# Patient Record
Sex: MALE | Race: WHITE | Employment: OTHER | URBAN - METROPOLITAN AREA
[De-identification: names, ages, dates, MRNs, and addresses within clinical notes are randomized per-mention and may not be internally consistent; named-entity substitution may affect disease eponyms.]

---

## 2021-08-07 ENCOUNTER — HOSPITAL ENCOUNTER (INPATIENT)
Age: 76
LOS: 2 days | Discharge: HOME | DRG: 378 | End: 2021-08-09
Attending: EMERGENCY MEDICINE | Admitting: HOSPITALIST
Payer: COMMERCIAL

## 2021-08-07 ENCOUNTER — APPOINTMENT (OUTPATIENT)
Dept: CT IMAGING | Age: 76
DRG: 378 | End: 2021-08-07
Attending: EMERGENCY MEDICINE
Payer: COMMERCIAL

## 2021-08-07 DIAGNOSIS — N17.9 AKI (ACUTE KIDNEY INJURY) (HCC): ICD-10-CM

## 2021-08-07 DIAGNOSIS — R68.89: ICD-10-CM

## 2021-08-07 DIAGNOSIS — R63.8: ICD-10-CM

## 2021-08-07 DIAGNOSIS — R10.31 RLQ ABDOMINAL PAIN: ICD-10-CM

## 2021-08-07 DIAGNOSIS — K92.2 GASTROINTESTINAL HEMORRHAGE, UNSPECIFIED GASTROINTESTINAL HEMORRHAGE TYPE: Primary | ICD-10-CM

## 2021-08-07 LAB
ALBUMIN SERPL-MCNC: 3.7 G/DL (ref 3.5–5)
ALBUMIN/GLOB SERPL: 1.3 {RATIO} (ref 1.1–2.2)
ALP SERPL-CCNC: 73 U/L (ref 45–117)
ALT SERPL-CCNC: 30 U/L (ref 12–78)
ANION GAP SERPL CALC-SCNC: 2 MMOL/L (ref 5–15)
APPEARANCE UR: CLEAR
AST SERPL-CCNC: 17 U/L (ref 15–37)
BACTERIA URNS QL MICRO: NEGATIVE /HPF
BASOPHILS # BLD: 0 K/UL (ref 0–0.1)
BASOPHILS NFR BLD: 1 % (ref 0–1)
BILIRUB SERPL-MCNC: 0.5 MG/DL (ref 0.2–1)
BILIRUB UR QL CFM: NEGATIVE
BUN SERPL-MCNC: 22 MG/DL (ref 6–20)
BUN/CREAT SERPL: 15 (ref 12–20)
CALCIUM SERPL-MCNC: 10.3 MG/DL (ref 8.5–10.1)
CHLORIDE SERPL-SCNC: 108 MMOL/L (ref 97–108)
CO2 SERPL-SCNC: 30 MMOL/L (ref 21–32)
COLOR UR: ABNORMAL
CREAT SERPL-MCNC: 1.5 MG/DL (ref 0.7–1.3)
DIFFERENTIAL METHOD BLD: ABNORMAL
EOSINOPHIL # BLD: 0.1 K/UL (ref 0–0.4)
EOSINOPHIL NFR BLD: 1 % (ref 0–7)
EPITH CASTS URNS QL MICRO: ABNORMAL /LPF
ERYTHROCYTE [DISTWIDTH] IN BLOOD BY AUTOMATED COUNT: 13 % (ref 11.5–14.5)
GLOBULIN SER CALC-MCNC: 2.9 G/DL (ref 2–4)
GLUCOSE SERPL-MCNC: 117 MG/DL (ref 65–100)
GLUCOSE UR STRIP.AUTO-MCNC: NEGATIVE MG/DL
HCT VFR BLD AUTO: 41.3 % (ref 36.6–50.3)
HGB BLD-MCNC: 13.5 G/DL (ref 12.1–17)
HGB UR QL STRIP: NEGATIVE
HYALINE CASTS URNS QL MICRO: ABNORMAL /LPF (ref 0–5)
IMM GRANULOCYTES # BLD AUTO: 0 K/UL (ref 0–0.04)
IMM GRANULOCYTES NFR BLD AUTO: 0 % (ref 0–0.5)
KETONES UR QL STRIP.AUTO: NEGATIVE MG/DL
LEUKOCYTE ESTERASE UR QL STRIP.AUTO: NEGATIVE
LIPASE SERPL-CCNC: 124 U/L (ref 73–393)
LYMPHOCYTES # BLD: 1.2 K/UL (ref 0.8–3.5)
LYMPHOCYTES NFR BLD: 14 % (ref 12–49)
MAGNESIUM SERPL-MCNC: 2.7 MG/DL (ref 1.6–2.4)
MCH RBC QN AUTO: 28.8 PG (ref 26–34)
MCHC RBC AUTO-ENTMCNC: 32.7 G/DL (ref 30–36.5)
MCV RBC AUTO: 88.2 FL (ref 80–99)
MONOCYTES # BLD: 0.7 K/UL (ref 0–1)
MONOCYTES NFR BLD: 8 % (ref 5–13)
NEUTS SEG # BLD: 6.6 K/UL (ref 1.8–8)
NEUTS SEG NFR BLD: 76 % (ref 32–75)
NITRITE UR QL STRIP.AUTO: NEGATIVE
NRBC # BLD: 0 K/UL (ref 0–0.01)
NRBC BLD-RTO: 0 PER 100 WBC
PH UR STRIP: 5.5 [PH] (ref 5–8)
PLATELET # BLD AUTO: 233 K/UL (ref 150–400)
PMV BLD AUTO: 10.2 FL (ref 8.9–12.9)
POTASSIUM SERPL-SCNC: 4.4 MMOL/L (ref 3.5–5.1)
PROT SERPL-MCNC: 6.6 G/DL (ref 6.4–8.2)
PROT UR STRIP-MCNC: ABNORMAL MG/DL
RBC # BLD AUTO: 4.68 M/UL (ref 4.1–5.7)
RBC #/AREA URNS HPF: ABNORMAL /HPF (ref 0–5)
SODIUM SERPL-SCNC: 140 MMOL/L (ref 136–145)
SP GR UR REFRACTOMETRY: 1.03 (ref 1–1.03)
UR CULT HOLD, URHOLD: NORMAL
UROBILINOGEN UR QL STRIP.AUTO: 0.2 EU/DL (ref 0.2–1)
WBC # BLD AUTO: 8.6 K/UL (ref 4.1–11.1)
WBC URNS QL MICRO: ABNORMAL /HPF (ref 0–4)

## 2021-08-07 PROCEDURE — 74011000258 HC RX REV CODE- 258: Performed by: HOSPITALIST

## 2021-08-07 PROCEDURE — 74011000636 HC RX REV CODE- 636: Performed by: STUDENT IN AN ORGANIZED HEALTH CARE EDUCATION/TRAINING PROGRAM

## 2021-08-07 PROCEDURE — 74011250636 HC RX REV CODE- 250/636: Performed by: HOSPITALIST

## 2021-08-07 PROCEDURE — 36415 COLL VENOUS BLD VENIPUNCTURE: CPT

## 2021-08-07 PROCEDURE — 74177 CT ABD & PELVIS W/CONTRAST: CPT

## 2021-08-07 PROCEDURE — 65270000029 HC RM PRIVATE

## 2021-08-07 PROCEDURE — 83690 ASSAY OF LIPASE: CPT

## 2021-08-07 PROCEDURE — 80053 COMPREHEN METABOLIC PANEL: CPT

## 2021-08-07 PROCEDURE — 85025 COMPLETE CBC W/AUTO DIFF WBC: CPT

## 2021-08-07 PROCEDURE — 81001 URINALYSIS AUTO W/SCOPE: CPT

## 2021-08-07 PROCEDURE — 99283 EMERGENCY DEPT VISIT LOW MDM: CPT

## 2021-08-07 PROCEDURE — 83735 ASSAY OF MAGNESIUM: CPT

## 2021-08-07 RX ORDER — ACETAMINOPHEN 325 MG/1
650 TABLET ORAL
Status: DISCONTINUED | OUTPATIENT
Start: 2021-08-07 | End: 2021-08-09 | Stop reason: HOSPADM

## 2021-08-07 RX ORDER — MORPHINE SULFATE 2 MG/ML
2 INJECTION, SOLUTION INTRAMUSCULAR; INTRAVENOUS
Status: DISCONTINUED | OUTPATIENT
Start: 2021-08-07 | End: 2021-08-09 | Stop reason: HOSPADM

## 2021-08-07 RX ORDER — OXYCODONE HYDROCHLORIDE 5 MG/1
5 TABLET ORAL
Status: DISCONTINUED | OUTPATIENT
Start: 2021-08-07 | End: 2021-08-09 | Stop reason: HOSPADM

## 2021-08-07 RX ORDER — DIPHENHYDRAMINE HCL 25 MG
25 CAPSULE ORAL
Status: DISCONTINUED | OUTPATIENT
Start: 2021-08-07 | End: 2021-08-09 | Stop reason: HOSPADM

## 2021-08-07 RX ORDER — DIPHENHYDRAMINE HYDROCHLORIDE 50 MG/ML
25 INJECTION, SOLUTION INTRAMUSCULAR; INTRAVENOUS
Status: DISCONTINUED | OUTPATIENT
Start: 2021-08-07 | End: 2021-08-09 | Stop reason: HOSPADM

## 2021-08-07 RX ORDER — CALCIUM CARB/MAGNESIUM CARB 311-232MG
5 TABLET ORAL
Status: DISCONTINUED | OUTPATIENT
Start: 2021-08-07 | End: 2021-08-09 | Stop reason: HOSPADM

## 2021-08-07 RX ORDER — POLYETHYLENE GLYCOL 3350 17 G/17G
17 POWDER, FOR SOLUTION ORAL DAILY PRN
Status: DISCONTINUED | OUTPATIENT
Start: 2021-08-07 | End: 2021-08-09 | Stop reason: HOSPADM

## 2021-08-07 RX ORDER — SODIUM CHLORIDE 0.9 % (FLUSH) 0.9 %
5-40 SYRINGE (ML) INJECTION EVERY 8 HOURS
Status: DISCONTINUED | OUTPATIENT
Start: 2021-08-07 | End: 2021-08-09 | Stop reason: HOSPADM

## 2021-08-07 RX ORDER — ONDANSETRON 2 MG/ML
4 INJECTION INTRAMUSCULAR; INTRAVENOUS
Status: DISCONTINUED | OUTPATIENT
Start: 2021-08-07 | End: 2021-08-09 | Stop reason: HOSPADM

## 2021-08-07 RX ORDER — SODIUM CHLORIDE 0.9 % (FLUSH) 0.9 %
5-40 SYRINGE (ML) INJECTION AS NEEDED
Status: DISCONTINUED | OUTPATIENT
Start: 2021-08-07 | End: 2021-08-09 | Stop reason: HOSPADM

## 2021-08-07 RX ORDER — FACIAL-BODY WIPES
10 EACH TOPICAL DAILY PRN
Status: DISCONTINUED | OUTPATIENT
Start: 2021-08-07 | End: 2021-08-09 | Stop reason: HOSPADM

## 2021-08-07 RX ORDER — ACETAMINOPHEN 650 MG/1
650 SUPPOSITORY RECTAL
Status: DISCONTINUED | OUTPATIENT
Start: 2021-08-07 | End: 2021-08-09 | Stop reason: HOSPADM

## 2021-08-07 RX ORDER — LORAZEPAM 2 MG/ML
0.5 INJECTION INTRAMUSCULAR
Status: DISCONTINUED | OUTPATIENT
Start: 2021-08-07 | End: 2021-08-09 | Stop reason: HOSPADM

## 2021-08-07 RX ORDER — SODIUM CHLORIDE 9 MG/ML
125 INJECTION, SOLUTION INTRAVENOUS CONTINUOUS
Status: DISCONTINUED | OUTPATIENT
Start: 2021-08-07 | End: 2021-08-09 | Stop reason: HOSPADM

## 2021-08-07 RX ADMIN — FAMOTIDINE 20 MG: 10 INJECTION, SOLUTION INTRAVENOUS at 21:58

## 2021-08-07 RX ADMIN — IOPAMIDOL 100 ML: 755 INJECTION, SOLUTION INTRAVENOUS at 18:32

## 2021-08-07 RX ADMIN — SODIUM CHLORIDE 125 ML/HR: 9 INJECTION, SOLUTION INTRAVENOUS at 21:59

## 2021-08-07 NOTE — ED PROVIDER NOTES
81-year-old male with a history of kidney cancer, hemorrhoids, hypertension, and anxiety has had 5 or 6 days of rectal bleeding, both bright red and dark red. He is also had some right lower quadrant abdominal pain. Occasional nausea, but no vomiting. No fever. This is not typical of his hemorrhoidal bleeding. He does not have a gastroenterologist.  No fevers. At times today he was feeling dizzy. No chest pain or trouble breathing.            Past Medical History:   Diagnosis Date    Arthritis     marc shoulders and right hip    Cancer ([address]     kidney    GERD (gastroesophageal reflux disease)     Hemorrhoids     High cholesterol     Hypertension     Ill-defined condition     cataracts--no surgery yet    Other ill-defined conditions(799.89) 8/2012    right renal mass newly diagnosed    Other ill-defined conditions(799.89)     heart murmur    Prostate infection     resolved    Psychiatric disorder     panic attacks, last one 9/2013; anxiety    Unspecified sleep apnea 2006    surgery- excision of uvula; 2013 using dental mouthpiece       Past Surgical History:   Procedure Laterality Date    HX APPENDECTOMY      as a child    HX CHOLECYSTECTOMY  10/30/2013    lap choley    HX HEENT  2006    excision of uvula    HX HERNIA REPAIR      rih repair age 8 and again 5 years ago, LIH 27 years ago   [facility]  [address]    right knee athroscopy    HX ORTHOPAEDIC Right 2016    hip replacement    HX SHOULDER REPLACEMENT Left 2014    HX SHOULDER REPLACEMENT Right 2015    HX TONSILLECTOMY      HX UROLOGICAL  2012    right partial nephrectomy    AL EXCISION OF UVULA           Family History:   Problem Relation Age of Onset    Hypertension Mother     Cancer Father     Heart Disease Father     Hypertension Father     Stroke Father        Social History     Socioeconomic History    Marital status:      Spouse name: Not on file    Number of children: Not on file    Years of education: Not on file    Highest education level: Not on file   Occupational History    Not on file   Tobacco Use    Smoking status: Never Smoker    Smokeless tobacco: Never Used   Substance and Sexual Activity    Alcohol use: Yes     Alcohol/week: 0.0 standard drinks     Comment: rarely, nothing past 6 months    Drug use: No    Sexual activity: Not on file   Other Topics Concern    Not on file   Social History Narrative    Not on file     Social Determinants of Health     Financial Resource Strain:     Difficulty of Paying Living Expenses:    Food Insecurity:     Worried About Running Out of Food in the Last Year:     [address] in the Last Year:    Transportation Needs:     Lack of Transportation (Medical):  Lack of Transportation (Non-Medical):    Physical Activity:     Days of Exercise per Week:     Minutes of Exercise per Session:    Stress:     Feeling of Stress :    Social Connections:     Frequency of Communication with Friends and Family:     Frequency of Social Gatherings with Friends and Family:     Attends Jain Services:     Active Member of Clubs or Organizations:     Attends Club or Organization Meetings:     Marital Status:    Intimate Partner Violence:     Fear of Current or Ex-Partner:     Emotionally Abused:     Physically Abused:     Sexually Abused: ALLERGIES: Patient has no known allergies. Review of Systems   Constitutional: Negative for fever. HENT: Negative for trouble swallowing. Eyes: Negative for visual disturbance. Respiratory: Negative for cough. Cardiovascular: Negative for chest pain. Gastrointestinal: Positive for abdominal pain and blood in stool. Genitourinary: Negative for difficulty urinating. Musculoskeletal: Negative for gait problem. Skin: Negative for rash. Neurological: Negative for headaches. Hematological: Does not bruise/bleed easily. Psychiatric/Behavioral: Negative for sleep disturbance.        Vitals:    08/07/21 1724   BP: 118/61   Pulse: (!) 112   Resp: 18   Temp: 97.1 °F (36.2 °C)   SpO2: 96%   Weight: 83.4 kg (183 lb 13.8 oz)   Height: 6' (1.829 m)            Physical Exam  Constitutional:       Appearance: Normal appearance. HENT:      Head: Normocephalic. Nose: Nose normal.      Mouth/Throat:      Mouth: Mucous membranes are moist.   Eyes:      Extraocular Movements: Extraocular movements intact. Conjunctiva/sclera: Conjunctivae normal.   Cardiovascular:      Rate and Rhythm: Normal rate. Pulmonary:      Effort: Pulmonary effort is normal. No respiratory distress. Abdominal:      General: Abdomen is flat. Tenderness: There is abdominal tenderness in the right lower quadrant. Musculoskeletal:         General: Normal range of motion. Skin:     Findings: No rash. Neurological:      General: No focal deficit present. Mental Status: He is alert. Psychiatric:         Behavior: Behavior normal.          MDM  Number of Diagnoses or Management Options  LILI (acute kidney injury) ([address]  Excessive intake of calcium  Excessive magnesium intake  Gastrointestinal hemorrhage, unspecified gastrointestinal hemorrhage type  RLQ abdominal pain  Diagnosis management comments: Perfect Serve Consult for Admission  7:43 PM    ED Room Number: SFMERWT/WT  Patient Name and age:  Diedre Gilford 70 y.o.  male  Working Diagnosis: Gastrointestinal hemorrhage, unspecified gastrointestinal hemorrhage type  (primary encounter diagnosis)  RLQ abdominal pain  LILI (acute kidney injury) ([address]  Excessive intake of calcium  Excessive magnesium intake    COVID-19 Suspicion:  no  Sepsis present:  no  Reassessment needed: no  Code Status:  Full Code  Readmission: no  Isolation Requirements:  no  Recommended Level of Care:  med/surg  Department:[facility] ED - [phone]  Other: Patient has no gastroenterologist, but appears to be bleeding from the right lower quadrant, seen on scan.   Heart rate is slightly elevated, but blood pressure is stable and hemoglobin is stable. This has likely been going on for about 5 days.          Amount and/or Complexity of Data Reviewed  Decide to obtain previous medical records or to obtain history from someone other than the patient: yes           Procedures

## 2021-08-07 NOTE — ED NOTES
Patient ambulatory to bathroom to void. Instructed on urine specimen collection. Verbalized understanding.

## 2021-08-07 NOTE — ED NOTES

## 2021-08-07 NOTE — ED TRIAGE NOTES
Pt reports abdominal pain and rectal bleeding x 5 days. Pt states he has internal hemorrhoids. Hx of IBS. Also reports back pain. Denies vomiting. Reports generalized weakness and diarrhea.

## 2021-08-08 LAB
25(OH)D3 SERPL-MCNC: 25.9 NG/ML (ref 30–100)
ALBUMIN SERPL-MCNC: 3.4 G/DL (ref 3.5–5)
ALBUMIN/GLOB SERPL: 1.3 {RATIO} (ref 1.1–2.2)
ALP SERPL-CCNC: 64 U/L (ref 45–117)
ALT SERPL-CCNC: 25 U/L (ref 12–78)
ANION GAP SERPL CALC-SCNC: 4 MMOL/L (ref 5–15)
AST SERPL-CCNC: 11 U/L (ref 15–37)
BASOPHILS # BLD: 0 K/UL (ref 0–0.1)
BASOPHILS NFR BLD: 1 % (ref 0–1)
BILIRUB SERPL-MCNC: 0.6 MG/DL (ref 0.2–1)
BUN SERPL-MCNC: 20 MG/DL (ref 6–20)
BUN/CREAT SERPL: 16 (ref 12–20)
CALCIUM SERPL-MCNC: 8.8 MG/DL (ref 8.5–10.1)
CALCIUM SERPL-MCNC: 8.9 MG/DL (ref 8.5–10.1)
CHLORIDE SERPL-SCNC: 109 MMOL/L (ref 97–108)
CO2 SERPL-SCNC: 27 MMOL/L (ref 21–32)
COVID-19 RAPID TEST, COVR: NOT DETECTED
CREAT SERPL-MCNC: 1.24 MG/DL (ref 0.7–1.3)
CREAT UR-MCNC: 95 MG/DL
DIFFERENTIAL METHOD BLD: ABNORMAL
EOSINOPHIL # BLD: 0.2 K/UL (ref 0–0.4)
EOSINOPHIL NFR BLD: 3 % (ref 0–7)
ERYTHROCYTE [DISTWIDTH] IN BLOOD BY AUTOMATED COUNT: 12.9 % (ref 11.5–14.5)
EST. AVERAGE GLUCOSE BLD GHB EST-MCNC: 120 MG/DL
GLOBULIN SER CALC-MCNC: 2.7 G/DL (ref 2–4)
GLUCOSE SERPL-MCNC: 97 MG/DL (ref 65–100)
HBA1C MFR BLD: 5.8 % (ref 4–5.6)
HCT VFR BLD AUTO: 37.6 % (ref 36.6–50.3)
HCT VFR BLD AUTO: 37.7 % (ref 36.6–50.3)
HGB BLD-MCNC: 12 G/DL (ref 12.1–17)
HGB BLD-MCNC: 12.2 G/DL (ref 12.1–17)
IMM GRANULOCYTES # BLD AUTO: 0 K/UL (ref 0–0.04)
IMM GRANULOCYTES NFR BLD AUTO: 0 % (ref 0–0.5)
LYMPHOCYTES # BLD: 1.1 K/UL (ref 0.8–3.5)
LYMPHOCYTES NFR BLD: 20 % (ref 12–49)
MCH RBC QN AUTO: 27.9 PG (ref 26–34)
MCHC RBC AUTO-ENTMCNC: 31.9 G/DL (ref 30–36.5)
MCV RBC AUTO: 87.4 FL (ref 80–99)
MONOCYTES # BLD: 0.6 K/UL (ref 0–1)
MONOCYTES NFR BLD: 10 % (ref 5–13)
NEUTS SEG # BLD: 3.8 K/UL (ref 1.8–8)
NEUTS SEG NFR BLD: 66 % (ref 32–75)
NRBC # BLD: 0 K/UL (ref 0–0.01)
NRBC BLD-RTO: 0 PER 100 WBC
PLATELET # BLD AUTO: 200 K/UL (ref 150–400)
PMV BLD AUTO: 10.4 FL (ref 8.9–12.9)
POTASSIUM SERPL-SCNC: 4.2 MMOL/L (ref 3.5–5.1)
PROT SERPL-MCNC: 6.1 G/DL (ref 6.4–8.2)
PTH-INTACT SERPL-MCNC: 60.6 PG/ML (ref 18.4–88)
RBC # BLD AUTO: 4.3 M/UL (ref 4.1–5.7)
SODIUM SERPL-SCNC: 140 MMOL/L (ref 136–145)
SODIUM UR-SCNC: 109 MMOL/L
SOURCE, COVRS: NORMAL
UR CULT HOLD, URHOLD: NORMAL
WBC # BLD AUTO: 5.7 K/UL (ref 4.1–11.1)

## 2021-08-08 PROCEDURE — 87635 SARS-COV-2 COVID-19 AMP PRB: CPT

## 2021-08-08 PROCEDURE — 74011250636 HC RX REV CODE- 250/636: Performed by: HOSPITALIST

## 2021-08-08 PROCEDURE — 83970 ASSAY OF PARATHORMONE: CPT

## 2021-08-08 PROCEDURE — 80053 COMPREHEN METABOLIC PANEL: CPT

## 2021-08-08 PROCEDURE — 82306 VITAMIN D 25 HYDROXY: CPT

## 2021-08-08 PROCEDURE — 84300 ASSAY OF URINE SODIUM: CPT

## 2021-08-08 PROCEDURE — 85018 HEMOGLOBIN: CPT

## 2021-08-08 PROCEDURE — 85025 COMPLETE CBC W/AUTO DIFF WBC: CPT

## 2021-08-08 PROCEDURE — 65270000029 HC RM PRIVATE

## 2021-08-08 PROCEDURE — 82668 ASSAY OF ERYTHROPOIETIN: CPT

## 2021-08-08 PROCEDURE — 74011250637 HC RX REV CODE- 250/637: Performed by: INTERNAL MEDICINE

## 2021-08-08 PROCEDURE — 36415 COLL VENOUS BLD VENIPUNCTURE: CPT

## 2021-08-08 PROCEDURE — 83036 HEMOGLOBIN GLYCOSYLATED A1C: CPT

## 2021-08-08 PROCEDURE — 94760 N-INVAS EAR/PLS OXIMETRY 1: CPT

## 2021-08-08 PROCEDURE — 74011250637 HC RX REV CODE- 250/637: Performed by: SPECIALIST

## 2021-08-08 PROCEDURE — 82570 ASSAY OF URINE CREATININE: CPT

## 2021-08-08 PROCEDURE — 82652 VIT D 1 25-DIHYDROXY: CPT

## 2021-08-08 RX ORDER — ZINC GLUCONATE 10 MG
250 LOZENGE ORAL DAILY
COMMUNITY

## 2021-08-08 RX ORDER — BISACODYL 5 MG
10 TABLET, DELAYED RELEASE (ENTERIC COATED) ORAL
Status: COMPLETED | OUTPATIENT
Start: 2021-08-08 | End: 2021-08-08

## 2021-08-08 RX ORDER — BISMUTH SUBSALICYLATE 262 MG
1 TABLET,CHEWABLE ORAL DAILY
COMMUNITY

## 2021-08-08 RX ORDER — LANOLIN ALCOHOL/MO/W.PET/CERES
400 CREAM (GRAM) TOPICAL DAILY
COMMUNITY

## 2021-08-08 RX ORDER — POLYETHYLENE GLYCOL 3350 17 G/17G
17 POWDER, FOR SOLUTION ORAL
Status: COMPLETED | OUTPATIENT
Start: 2021-08-08 | End: 2021-08-08

## 2021-08-08 RX ORDER — MAGNESIUM CITRATE
296 SOLUTION, ORAL ORAL
Status: COMPLETED | OUTPATIENT
Start: 2021-08-08 | End: 2021-08-08

## 2021-08-08 RX ORDER — LANOLIN ALCOHOL/MO/W.PET/CERES
65 CREAM (GRAM) TOPICAL DAILY
COMMUNITY

## 2021-08-08 RX ORDER — PANTOPRAZOLE SODIUM 20 MG/1
40 TABLET, DELAYED RELEASE ORAL DAILY
COMMUNITY

## 2021-08-08 RX ADMIN — Medication 10 ML: at 06:00

## 2021-08-08 RX ADMIN — Medication 10 ML: at 20:48

## 2021-08-08 RX ADMIN — POLYETHYLENE GLYCOL 3350 17 G: 17 POWDER, FOR SOLUTION ORAL at 15:43

## 2021-08-08 RX ADMIN — POLYETHYLENE GLYCOL 3350 17 G: 17 POWDER, FOR SOLUTION ORAL at 15:39

## 2021-08-08 RX ADMIN — Medication 10 ML: at 15:44

## 2021-08-08 RX ADMIN — MAGNESIUM CITRATE 296 ML: 1.75 LIQUID ORAL at 20:47

## 2021-08-08 RX ADMIN — BISACODYL 10 MG: 5 TABLET, COATED ORAL at 22:37

## 2021-08-08 RX ADMIN — POLYETHYLENE GLYCOL 3350 17 G: 17 POWDER, FOR SOLUTION ORAL at 15:36

## 2021-08-08 RX ADMIN — SODIUM CHLORIDE 125 ML/HR: 9 INJECTION, SOLUTION INTRAVENOUS at 22:38

## 2021-08-08 RX ADMIN — Medication 10 ML: at 20:49

## 2021-08-08 RX ADMIN — POLYETHYLENE GLYCOL 3350 17 G: 17 POWDER, FOR SOLUTION ORAL at 15:41

## 2021-08-08 RX ADMIN — SODIUM CHLORIDE 125 ML/HR: 9 INJECTION, SOLUTION INTRAVENOUS at 15:41

## 2021-08-08 RX ADMIN — POLYETHYLENE GLYCOL 3350 17 G: 17 POWDER, FOR SOLUTION ORAL at 15:42

## 2021-08-08 RX ADMIN — POLYETHYLENE GLYCOL 3350 17 G: 17 POWDER, FOR SOLUTION ORAL at 15:37

## 2021-08-08 RX ADMIN — BISACODYL 10 MG: 5 TABLET, COATED ORAL at 22:38

## 2021-08-08 RX ADMIN — BISACODYL 10 MG: 5 TABLET, COATED ORAL at 20:47

## 2021-08-08 RX ADMIN — POLYETHYLENE GLYCOL 3350 17 G: 17 POWDER, FOR SOLUTION ORAL at 15:38

## 2021-08-08 RX ADMIN — POLYETHYLENE GLYCOL 3350 17 G: 17 POWDER, FOR SOLUTION ORAL at 15:40

## 2021-08-08 NOTE — PROGRESS NOTES
Bedside shift change report given to Елена (oncoming nurse) by Salima Bose (offgoing nurse). Report included the following information SBAR, Kardex and MAR.

## 2021-08-08 NOTE — PROGRESS NOTES
Problem: Patient Education: Go to Patient Education Activity  Goal: Patient/Family Education  Outcome: Progressing Towards Goal     Problem: Upper and Lower GI Bleed: Day 1  Goal: Off Pathway (Use only if patient is Off Pathway)  Outcome: Progressing Towards Goal  Goal: Activity/Safety  Outcome: Progressing Towards Goal  Goal: Consults, if ordered  Outcome: Progressing Towards Goal  Goal: Diagnostic Test/Procedures  Outcome: Progressing Towards Goal  Goal: Nutrition/Diet  Outcome: Progressing Towards Goal  Goal: Discharge Planning  Outcome: Progressing Towards Goal  Goal: Medications  Outcome: Progressing Towards Goal  Goal: Respiratory  Outcome: Progressing Towards Goal  Goal: Treatments/Interventions/Procedures  Outcome: Progressing Towards Goal  Goal: Psychosocial  Outcome: Progressing Towards Goal  Goal: *Optimal pain control at patient's stated goal  Outcome: Progressing Towards Goal  Goal: *Hemodynamically stable  Outcome: Progressing Towards Goal  Goal: *Demonstrates progressive activity  Outcome: Progressing Towards Goal     Problem: Upper and Lower GI Bleed: Day 2  Goal: Off Pathway (Use only if patient is Off Pathway)  Outcome: Progressing Towards Goal  Goal: Activity/Safety  Outcome: Progressing Towards Goal  Goal: Consults, if ordered  Outcome: Progressing Towards Goal  Goal: Diagnostic Test/Procedures  Outcome: Progressing Towards Goal  Goal: Nutrition/Diet  Outcome: Progressing Towards Goal  Goal: Discharge Planning  Outcome: Progressing Towards Goal  Goal: Medications  Outcome: Progressing Towards Goal  Goal: Respiratory  Outcome: Progressing Towards Goal  Goal: Treatments/Interventions/Procedures  Outcome: Progressing Towards Goal  Goal: Psychosocial  Outcome: Progressing Towards Goal  Goal: *Optimal pain control at patient's stated goal  Outcome: Progressing Towards Goal  Goal: *Hemodynamically stable  Outcome: Progressing Towards Goal  Goal: *Tolerating diet  Outcome: Progressing Towards Goal  Goal: *Demonstrates progressive activity  Outcome: Progressing Towards Goal     Problem: Upper and Lower GI Bleed: Day 3  Goal: Off Pathway (Use only if patient is Off Pathway)  Outcome: Progressing Towards Goal  Goal: Activity/Safety  Outcome: Progressing Towards Goal  Goal: Diagnostic Test/Procedures  Outcome: Progressing Towards Goal  Goal: Nutrition/Diet  Outcome: Progressing Towards Goal  Goal: Discharge Planning  Outcome: Progressing Towards Goal  Goal: Medications  Outcome: Progressing Towards Goal  Goal: Treatments/Interventions/Procedures  Outcome: Progressing Towards Goal  Goal: Psychosocial  Outcome: Progressing Towards Goal     Problem: Upper and Lower GI Bleed:  Discharge Outcomes  Goal: *Hemodynamically stable  Outcome: Progressing Towards Goal  Goal: *Lungs clear or at baseline  Outcome: Progressing Towards Goal  Goal: *Demonstrates independent activity or return to baseline  Outcome: Progressing Towards Goal  Goal: *Pain is controlled to three or less  Outcome: Progressing Towards Goal  Goal: *Verbalizes understanding and describes prescribed diet  Outcome: Progressing Towards Goal  Goal: *Tolerating diet  Outcome: Progressing Towards Goal  Goal: *Verbalizes name, dosage, time, side effects, and number of days to continue medications  Outcome: Progressing Towards Goal  Goal: *Anxiety reduced or absent  Outcome: Progressing Towards Goal  Goal: *Understands and describes signs and symptoms to report to providers(Stroke Metric)  Outcome: Progressing Towards Goal  Goal: *Describes follow-up/return visits to physicians  Outcome: Progressing Towards Goal  Goal: *Describes available resources and support systems  Outcome: Progressing Towards Goal

## 2021-08-08 NOTE — PROGRESS NOTES
Aron Del Castillo [facility] [address]  [address] [facility], [place], [address] [place] [address]  [phone]      Medical Progress Note      NAME: Breonna Dominguez   :  1945  MRM:  [id number]    Date/Time: 2021  1:15 PM           Assessment / Plan:     #LGIB: Hb minimally dropped; no bleeding since admit. CT w/ suggested extravasation RLQ. Pt has had hemorrhoidal bleeding but described this as more brisk. Suspect diverticular origin.    - Serial H/H   - IV access, MIVF   - NPO while awaiting GI thoughts on scope    #RLQ Pain: Resolved. No diverticulitis on imaging; diverticulosis not painful. I suppose ischemic injury could cause this but less likely. Regardless, essentially resolved at this time    #LILI: Pre-renal. Improving with IVF                   Care Plan discussed with: Patient    Discussed:  Care Plan    Prophylaxis:  SCD's    Disposition:  Home w/Family           ___________________________________________________    Attending Physician: Sanket Marte MD        Subjective:     Chief Complaint:  Myrona Jomar, feels well today; pain improved    ROS:  (bold if positive, if negative)              Objective:       Vitals:          Last 24hrs VS reviewed since prior progress note.  Most recent are:    Visit Vitals  BP (!) 140/73 (BP 1 Location: Right upper arm, BP Patient Position: At rest;Supine)   Pulse 89   Temp 98.2 °F (36.8 °C)   Resp 18   Ht 6' (1.829 m)   Wt 83.4 kg (183 lb 13.8 oz)   SpO2 94%   BMI 24.94 kg/m²     SpO2 Readings from Last 6 Encounters:   21 94%   10/26/16 96%   10/19/16 98%   13 97%   10/30/13 99%   12 96%        No intake or output data in the 24 hours ending 21 1315       Exam:     Physical Exam:    Gen:  Well-developed, well-nourished, in no acute distress  HEENT:  Pink conjunctivae, PERRL, hearing intact to voice, moist mucous membranes  Neck:  Supple, without masses, thyroid non-tender  Resp:  No accessory muscle use, clear breath sounds without wheezes rales or rhonchi  Card:  No murmurs, normal S1, S2 without thrills, bruits or peripheral edema  Abd:  Soft, non-tender, non-distended, normoactive bowel sounds are present  Musc:  No cyanosis or clubbing  Skin:  No rashes or ulcers, skin turgor is good  Neuro:  Cranial nerves 3-12 are grossly intact,  strength is 5/5 bilaterally and dorsi / plantarflexion is 5/5 bilaterally, follows commands appropriately  Psych:  Good insight, oriented to person, place and time, alert       Medications Reviewed: (see below)    Lab Data Reviewed: (see below)    ______________________________________________________________________    Medications:     Current Facility-Administered Medications   Medication Dose Route Frequency    0.9% sodium chloride infusion  125 mL/hr IntraVENous CONTINUOUS    sodium chloride (NS) flush 5-40 mL  5-40 mL IntraVENous Q8H    sodium chloride (NS) flush 5-40 mL  5-40 mL IntraVENous PRN    acetaminophen (TYLENOL) tablet 650 mg  650 mg Oral Q6H PRN    Or    acetaminophen (TYLENOL) suppository 650 mg  650 mg Rectal Q6H PRN    polyethylene glycol (MIRALAX) packet 17 g  17 g Oral DAILY PRN    bisacodyL (DULCOLAX) suppository 10 mg  10 mg Rectal DAILY PRN    ondansetron (ZOFRAN) injection 4 mg  4 mg IntraVENous Q6H PRN    famotidine (PF) (PEPCID) 20 mg in 0.9% sodium chloride 10 mL injection  20 mg IntraVENous Q24H    melatonin (rapid dissolve) tablet 5 mg  5 mg Oral QHS PRN    LORazepam (ATIVAN) injection 0.5 mg  0.5 mg IntraVENous Q6H PRN    oxyCODONE IR (ROXICODONE) tablet 5 mg  5 mg Oral Q4H PRN    morphine injection 2 mg  2 mg IntraVENous Q4H PRN    diphenhydrAMINE (BENADRYL) injection 25 mg  25 mg IntraVENous Q6H PRN    diphenhydrAMINE (BENADRYL) capsule 25 mg  25 mg Oral Q6H PRN            Lab Review:     Recent Labs     08/08/21  1100 08/08/21 0222 08/07/21  1740   WBC  --  5.7 8.6   HGB 12.2 12.0* 13.5   HCT 37.7 37.6 41.3   PLT  --  200 233     Recent Labs     08/08/21 0222 08/07/21  1740    140   K 4.2 4.4   * 108   CO2 27 30   GLU 97 117*   BUN 20 22*   CREA 1.24 1.50*   CA 8.8  8.9 10.3*   MG  --  2.7*   ALB 3.4* 3.7   ALT 25 30     No components found for: [place]

## 2021-08-08 NOTE — CONSULTS
Austin Garcia. Hyun Barreto MD  [phone] office  [phone] voicemail   Gastroenterology Consultation Note      Admit Date: 8/7/2021  Consult Date: 8/8/2021   I greatly appreciate your asking me to see Shandra Sheldon, thank you very much for the opportunity to participate in his care. Narrative Assessment and Plan   · Hematochezia  · Bilateral lower quadrant pain    Prep for EGD/colonsocopy tomorrow. Subjective:     Chief Complaint: Gastrointestinal Bleeding    History of Present Illness: 70year old male who reports having had colonoscopy a few years ago but I can't find results here or at [facility]. Presents because of red blood in stool, mixed in stool, intermittently for 2 days. CT done but not CTA protocol, notes some tracer extravasation in RLQ.   Hgb is 12    PCP:  Mykel Bledsoe DO    Past Medical History:   Diagnosis Date    Arthritis     marc shoulders and right hip    Cancer ([facility] [address]     kidney    GERD (gastroesophageal reflux disease)     Hemorrhoids     High cholesterol     Hypertension     Ill-defined condition     cataracts--no surgery yet    Other ill-defined conditions(799.89) 8/2012    right renal mass newly diagnosed    Other ill-defined conditions(799.89)     heart murmur    Prostate infection     resolved    Psychiatric disorder     panic attacks, last one 9/2013; anxiety    Unspecified sleep apnea 2006    surgery- excision of uvula; 2013 using dental mouthpiece        Past Surgical History:   Procedure Laterality Date    HX APPENDECTOMY      as a child    HX CHOLECYSTECTOMY  10/30/2013    lap choley    HX HEENT  2006    excision of uvula    HX HERNIA REPAIR      rih repair age 8 and again 5 years ago, LIH 27 years ago   Talia Meier HX ORTHOPAEDIC  1998    right knee athroscopy    HX ORTHOPAEDIC Right 2016    hip replacement    HX SHOULDER REPLACEMENT Left 2014    HX SHOULDER REPLACEMENT Right 2015    HX TONSILLECTOMY      HX UROLOGICAL  2012    right partial nephrectomy    OH EXCISION OF UVULA         Social History     Tobacco Use    Smoking status: Never Smoker    Smokeless tobacco: Never Used   Substance Use Topics    Alcohol use: Yes     Alcohol/week: 0.0 standard drinks     Comment: rarely, nothing past 6 months        Family History   Problem Relation Age of Onset    Hypertension Mother     Cancer Father     Heart Disease Father     Hypertension Father     Stroke Father         No Known Allergies         Home Medications:  Prior to Admission Medications   Prescriptions Last Dose Informant Patient Reported? Taking? DULOXETINE HCL, BULK, 8/7/2021 at 1100  Yes Yes   Sig: Take 90 mg by mouth every evening. doxycycline (ADOXA) 100 mg tablet 8/7/2021 at 1100  Yes Yes   Sig: Take 100 mg by mouth daily. ferrous sulfate 325 mg (65 mg iron) tablet 8/7/2021 at 1100  Yes Yes   Sig: Take 65 mg by mouth daily. folic acid 084 mcg tablet 8/7/2021 at 1100  Yes Yes   Sig: Take 400 mg by mouth daily. lisinopril (PRINIVIL, ZESTRIL) 20 mg tablet 8/7/2021 at 1100  Yes Yes   Sig: Take 20 mg by mouth daily. magnesium 250 mg tab 8/7/2021 at 1100  Yes Yes   Sig: Take 250 mg by mouth daily. multivitamin (ONE A DAY) tablet 8/7/2021 at 1100  Yes Yes   Sig: Take 1 Tablet by mouth daily. pantoprazole (Protonix) 20 mg tablet 8/7/2021 at 1100  Yes Yes   Sig: Take 40 mg by mouth daily. traMADol (ULTRAM) 50 mg tablet 8/7/2021 at 1100  Yes Yes   Sig: Take 50 mg by mouth every six (6) hours as needed for Pain.       Facility-Administered Medications: None       Hospital Medications:  Current Facility-Administered Medications   Medication Dose Route Frequency    polyethylene glycol (MIRALAX) packet 17 g  17 g Oral Q15MIN    magnesium citrate solution 296 mL  296 mL Oral NOW    0.9% sodium chloride infusion  125 mL/hr IntraVENous CONTINUOUS    sodium chloride (NS) flush 5-40 mL  5-40 mL IntraVENous Q8H    sodium chloride (NS) flush 5-40 mL  5-40 mL IntraVENous PRN    acetaminophen (TYLENOL) tablet 650 mg  650 mg Oral Q6H PRN    Or    acetaminophen (TYLENOL) suppository 650 mg  650 mg Rectal Q6H PRN    polyethylene glycol (MIRALAX) packet 17 g  17 g Oral DAILY PRN    bisacodyL (DULCOLAX) suppository 10 mg  10 mg Rectal DAILY PRN    ondansetron (ZOFRAN) injection 4 mg  4 mg IntraVENous Q6H PRN    melatonin (rapid dissolve) tablet 5 mg  5 mg Oral QHS PRN    LORazepam (ATIVAN) injection 0.5 mg  0.5 mg IntraVENous Q6H PRN    oxyCODONE IR (ROXICODONE) tablet 5 mg  5 mg Oral Q4H PRN    morphine injection 2 mg  2 mg IntraVENous Q4H PRN    diphenhydrAMINE (BENADRYL) injection 25 mg  25 mg IntraVENous Q6H PRN    diphenhydrAMINE (BENADRYL) capsule 25 mg  25 mg Oral Q6H PRN       Review of Systems: Admission ROS by Aliyah Cordon MD from 8/7/2021 were reviewed with the patient and changes (other than per HPI) include: none      Objective:     Physical Exam:  Visit Vitals  BP (!) 140/73 (BP 1 Location: Right upper arm, BP Patient Position: At rest;Supine)   Pulse 89   Temp 98.2 °F (36.8 °C)   Resp 18   Ht 6' (1.829 m)   Wt 83.4 kg (183 lb 13.8 oz)   SpO2 94%   BMI 24.94 kg/m²     SpO2 Readings from Last 6 Encounters:   08/08/21 94%   10/26/16 96%   10/19/16 98%   11/04/13 97%   10/30/13 99%   11/01/12 96%        No intake or output data in the 24 hours ending 08/08/21 1344   General: no distress, comfortable  Skin:  No rash or palpable dermatologic mass lesions  HEENT: Pupils equal, sclera anicteric, oropharynx with no gross lesions  Cardiovascular: No abnormal audible heart sounds, well perfused, no edema  Respiratory:  No abnormal audible breath sounds, normal respiratory effort, no throacic deformity  GI: , Abdomen nondistended, nontender, no mass, no free fluid, no rebound or guarding. Musculoskeletal:  No skeletal deformity nor acute arthritis noted.   Neurological:  Motor and sensory function intact in upper extremeties  Psychiatric:  Normal affect, memory intact, appears to have insight into current illness  Lymphatic:  No cervical, supraclavicular, or periumbilic lymphadenopathy    Laboratory:    Recent Results (from the past 24 hour(s))   CBC WITH AUTOMATED DIFF    Collection Time: 08/07/21  5:40 PM   Result Value Ref Range    WBC 8.6 4.1 - 11.1 K/uL    RBC 4.68 4.10 - 5.70 M/uL    HGB 13.5 12.1 - 17.0 g/dL    HCT 41.3 36.6 - 50.3 %    MCV 88.2 80.0 - 99.0 FL    MCH 28.8 26.0 - 34.0 PG    MCHC 32.7 30.0 - 36.5 g/dL    RDW 13.0 11.5 - 14.5 %    PLATELET 269 632 - 322 K/uL    MPV 10.2 8.9 - 12.9 FL    NRBC 0.0 0  WBC    ABSOLUTE NRBC 0.00 0.00 - 0.01 K/uL    NEUTROPHILS 76 (H) 32 - 75 %    LYMPHOCYTES 14 12 - 49 %    MONOCYTES 8 5 - 13 %    EOSINOPHILS 1 0 - 7 %    BASOPHILS 1 0 - 1 %    IMMATURE GRANULOCYTES 0 0.0 - 0.5 %    ABS. NEUTROPHILS 6.6 1.8 - 8.0 K/UL    ABS. LYMPHOCYTES 1.2 0.8 - 3.5 K/UL    ABS. MONOCYTES 0.7 0.0 - 1.0 K/UL    ABS. EOSINOPHILS 0.1 0.0 - 0.4 K/UL    ABS. BASOPHILS 0.0 0.0 - 0.1 K/UL    ABS. IMM. GRANS. 0.0 0.00 - 0.04 K/UL    DF AUTOMATED     METABOLIC PANEL, COMPREHENSIVE    Collection Time: 08/07/21  5:40 PM   Result Value Ref Range    Sodium 140 136 - 145 mmol/L    Potassium 4.4 3.5 - 5.1 mmol/L    Chloride 108 97 - 108 mmol/L    CO2 30 21 - 32 mmol/L    Anion gap 2 (L) 5 - 15 mmol/L    Glucose 117 (H) 65 - 100 mg/dL    BUN 22 (H) 6 - 20 MG/DL    Creatinine 1.50 (H) 0.70 - 1.30 MG/DL    BUN/Creatinine ratio 15 12 - 20      GFR est AA 56 (L) >60 ml/min/1.73m2    GFR est non-AA 46 (L) >60 ml/min/1.73m2    Calcium 10.3 (H) 8.5 - 10.1 MG/DL    Bilirubin, total 0.5 0.2 - 1.0 MG/DL    ALT (SGPT) 30 12 - 78 U/L    AST (SGOT) 17 15 - 37 U/L    Alk.  phosphatase 73 45 - 117 U/L    Protein, total 6.6 6.4 - 8.2 g/dL    Albumin 3.7 3.5 - 5.0 g/dL    Globulin 2.9 2.0 - 4.0 g/dL    A-G Ratio 1.3 1.1 - 2.2     LIPASE    Collection Time: 08/07/21  5:40 PM   Result Value Ref Range    Lipase 124 73 - 393 U/L   MAGNESIUM    Collection Time: 08/07/21  5:40 PM Result Value Ref Range    Magnesium 2.7 (H) 1.6 - 2.4 mg/dL   URINALYSIS W/MICROSCOPIC    Collection Time: 08/07/21  5:40 PM   Result Value Ref Range    Color DARK YELLOW      Appearance CLEAR CLEAR      Specific gravity 1.027 1.003 - 1.030      pH (UA) 5.5 5.0 - 8.0      Protein TRACE (A) NEG mg/dL    Glucose Negative NEG mg/dL    Ketone Negative NEG mg/dL    Blood Negative NEG      Urobilinogen 0.2 0.2 - 1.0 EU/dL    Nitrites Negative NEG      Leukocyte Esterase Negative NEG      WBC 0-4 0 - 4 /hpf    RBC 0-5 0 - 5 /hpf    Epithelial cells FEW FEW /lpf    Bacteria Negative NEG /hpf    Hyaline cast 0-2 0 - 5 /lpf   URINE CULTURE HOLD SAMPLE    Collection Time: 08/07/21  5:40 PM    Specimen: Serum; Urine   Result Value Ref Range    Urine culture hold        Urine on hold in Microbiology dept for 2 days. If unpreserved urine is submitted, it cannot be used for addtional testing after 24 hours, recollection will be required. BILIRUBIN, CONFIRM    Collection Time: 08/07/21  5:40 PM   Result Value Ref Range    Bilirubin UA, confirm Negative NEG     METABOLIC PANEL, COMPREHENSIVE    Collection Time: 08/08/21  2:22 AM   Result Value Ref Range    Sodium 140 136 - 145 mmol/L    Potassium 4.2 3.5 - 5.1 mmol/L    Chloride 109 (H) 97 - 108 mmol/L    CO2 27 21 - 32 mmol/L    Anion gap 4 (L) 5 - 15 mmol/L    Glucose 97 65 - 100 mg/dL    BUN 20 6 - 20 MG/DL    Creatinine 1.24 0.70 - 1.30 MG/DL    BUN/Creatinine ratio 16 12 - 20      GFR est AA >60 >60 ml/min/1.73m2    GFR est non-AA 57 (L) >60 ml/min/1.73m2    Calcium 8.9 8.5 - 10.1 MG/DL    Bilirubin, total 0.6 0.2 - 1.0 MG/DL    ALT (SGPT) 25 12 - 78 U/L    AST (SGOT) 11 (L) 15 - 37 U/L    Alk.  phosphatase 64 45 - 117 U/L    Protein, total 6.1 (L) 6.4 - 8.2 g/dL    Albumin 3.4 (L) 3.5 - 5.0 g/dL    Globulin 2.7 2.0 - 4.0 g/dL    A-G Ratio 1.3 1.1 - 2.2     CBC WITH AUTOMATED DIFF    Collection Time: 08/08/21  2:22 AM   Result Value Ref Range    WBC 5.7 4.1 - 11.1 K/uL RBC 4.30 4. 10 - 5.70 M/uL    HGB 12.0 (L) 12.1 - 17.0 g/dL    HCT 37.6 36.6 - 50.3 %    MCV 87.4 80.0 - 99.0 FL    MCH 27.9 26.0 - 34.0 PG    MCHC 31.9 30.0 - 36.5 g/dL    RDW 12.9 11.5 - 14.5 %    PLATELET 913 475 - 899 K/uL    MPV 10.4 8.9 - 12.9 FL    NRBC 0.0 0  WBC    ABSOLUTE NRBC 0.00 0.00 - 0.01 K/uL    NEUTROPHILS 66 32 - 75 %    LYMPHOCYTES 20 12 - 49 %    MONOCYTES 10 5 - 13 %    EOSINOPHILS 3 0 - 7 %    BASOPHILS 1 0 - 1 %    IMMATURE GRANULOCYTES 0 0.0 - 0.5 %    ABS. NEUTROPHILS 3.8 1.8 - 8.0 K/UL    ABS. LYMPHOCYTES 1.1 0.8 - 3.5 K/UL    ABS. MONOCYTES 0.6 0.0 - 1.0 K/UL    ABS. EOSINOPHILS 0.2 0.0 - 0.4 K/UL    ABS. BASOPHILS 0.0 0.0 - 0.1 K/UL    ABS. IMM. GRANS. 0.0 0.00 - 0.04 K/UL    DF AUTOMATED     HEMOGLOBIN A1C WITH EAG    Collection Time: 08/08/21  2:22 AM   Result Value Ref Range    Hemoglobin A1c 5.8 (H) 4.0 - 5.6 %    Est. average glucose 120 mg/dL   VITAMIN D, 25 HYDROXY    Collection Time: 08/08/21  2:22 AM   Result Value Ref Range    Vitamin D 25-Hydroxy 25.9 (L) 30 - 100 ng/mL   PTH INTACT    Collection Time: 08/08/21  2:22 AM   Result Value Ref Range    Calcium 8.8 8.5 - 10.1 MG/DL    PTH, Intact 60.6 18.4 - 88.0 pg/mL   SODIUM, UR, RANDOM    Collection Time: 08/08/21 10:45 AM   Result Value Ref Range    Sodium,urine random 109 MMOL/L   CREATININE, UR, RANDOM    Collection Time: 08/08/21 10:45 AM   Result Value Ref Range    Creatinine, urine 95.00 mg/dL   URINE CULTURE HOLD SAMPLE    Collection Time: 08/08/21 10:45 AM    Specimen: Serum   Result Value Ref Range    Urine culture hold        Urine on hold in Microbiology dept for 2 days. If unpreserved urine is submitted, it cannot be used for addtional testing after 24 hours, recollection will be required.    HGB & HCT    Collection Time: 08/08/21 11:00 AM   Result Value Ref Range    HGB 12.2 12.1 - 17.0 g/dL    HCT 37.7 36.6 - 50.3 %         Assessment/Plan:     Active Problems:    GIB (gastrointestinal bleeding) (8/7/2021)      RLQ abdominal pain (8/7/2021)      LILI (acute kidney injury) ([facility] [address] (8/7/2021)         See above narrative for full detail.

## 2021-08-08 NOTE — ED NOTES
TRANSFER - OUT REPORT:    Verbal report given to Nurse (name) on Micah Guardian  being transferred to 5th floor (unit) for routine progression of care       Report consisted of patients Situation, Background, Assessment and   Recommendations(SBAR). Information from the following report(s) SBAR, ED Summary, [facility] and Recent Results was reviewed with the receiving nurse. Lines:   Peripheral IV 08/07/21 Right Antecubital (Active)   Site Assessment Clean, dry, & intact 08/07/21 1739   Phlebitis Assessment 0 08/07/21 1739   Infiltration Assessment 0 08/07/21 1739   Dressing Status Clean, dry, & intact 08/07/21 1739   Dressing Type Transparent;Tape 08/07/21 1739   Hub Color/Line Status Pink 08/07/21 1739   Alcohol Cap Used Yes 08/07/21 1739        Opportunity for questions and clarification was provided.       Patient transported with:   Registered Nurse  Tech

## 2021-08-08 NOTE — PROGRESS NOTES
[facility]: MED RECONCILIATION    Prior to admission medication list updated by KIMBERLY Rodriguez, Pharmacist

## 2021-08-08 NOTE — H&P
[address]  [address] [place] [address] [phone]     Hospitalist Admission Note      NAME: Akin Richard   :     MRN:  [id number]     Date/Time:  2021 8:47 PM    Patient PCP: Regina Montez DO    Emergency Contact:    Extended Emergency Contact Information  Primary Emergency Contact: [facility]  Address: [address] Phone: [phone]  Relation: Spouse      Code: FULL    Isolation Precautions: There are currently no Active Isolations        Subjective:     CHIEF COMPLAINT: Abdominal pain     HISTORY OF PRESENT ILLNESS:     Mr. Joleen Valencia is a 70 y.o. male with history that includes HTN, kidney cancer, anxiety, and hemorrhoids presents with BRBPR for the past 5 days with a gradual onset of intermittent mild to moderate RLQ abdominal pain. The pain does not radiate. Describes it as dull and associated with dizziness, hematochezia, nausea and no bleeding. Appears to be due to RLQ bleed on CT scan but not specific. Not typical of his previous hemorrhoid bleeds. CBC/H&H appears stable but chemistry panel shows an an elevated BUN and creatinine. No Known Allergies    Prior to Admission medications    Medication Sig Start Date End Date Taking? Authorizing Provider   traMADol (ULTRAM) 50 mg tablet Take 50 mg by mouth every six (6) hours as needed for Pain. Provider, Historical   LORazepam (ATIVAN) 0.5 mg tablet Take 0.5 mg by mouth nightly as needed for Anxiety (1-2). Provider, Historical   lisinopril (PRINIVIL, ZESTRIL) 20 mg tablet Take 20 mg by mouth daily. Provider, Historical   HYDROcodone-acetaminophen (NORCO) 5-325 mg per tablet Take 1-2 Tabs by mouth every four (4) hours as needed for Pain. Max Daily Amount: 12 Tabs.  10/26/16   Cristiana Rodriguez MD   bacitracin (BACITRACIN) ointment **To start after dressing removed (per Dr. Katelyn Trujillo)** 10/26/16 Josr Rosario MD   ondansetron (ZOFRAN ODT) 4 mg disintegrating tablet Take 1 Tab by mouth every four (4) hours as needed for Nausea. 10/26/16   Josr Rosario MD   doxycycline (ADOXA) 100 mg tablet Take 100 mg by mouth daily. Provider, Historical   glucosamine-D3-Boswellia serr (OSTEO BI-FLEX, 5-LOXIN,) 1,500-400-100 mg-unit-mg tab Take 2 Tabs by mouth daily. Provider, Historical   furosemide (LASIX) 40 mg tablet Take 20 mg by mouth every evening. Provider, Historical   ranitidine (ZANTAC) 300 mg tablet Take 300 mg by mouth every evening. Provider, Historical   SULFAMETHOXAZOLE-TRIMETHOPRIM PO Take 800 mg by mouth every morning. Provider, Historical   doxazosin (CARDURA) 4 mg tablet Take 4 mg by mouth nightly. Provider, Historical   DULOXETINE HCL, BULK, Take 90 mg by mouth every evening. Provider, Historical   amlodipine (NORVASC) 5 mg tablet Take 5 mg by mouth nightly.     Other, MD Donnell       Past Medical History:   Diagnosis Date    Arthritis     marc shoulders and right hip    Cancer ([facility] [address]     kidney    GERD (gastroesophageal reflux disease)     Hemorrhoids     High cholesterol     Hypertension     Ill-defined condition     cataracts--no surgery yet    Other ill-defined conditions(799.89) 8/2012    right renal mass newly diagnosed    Other ill-defined conditions(799.89)     heart murmur    Prostate infection     resolved    Psychiatric disorder     panic attacks, last one 9/2013; anxiety    Unspecified sleep apnea 2006    surgery- excision of uvula; 2013 using dental mouthpiece        Past Surgical History:   Procedure Laterality Date    HX APPENDECTOMY      as a child    HX CHOLECYSTECTOMY  10/30/2013    lap neyda    HX HEENT  2006    excision of uvula    HX HERNIA REPAIR      rih repair age 8 and again 5 years ago, LIH 27 years ago   [facility] HX ORTHOPAEDIC  1998    right knee athroscopy    HX ORTHOPAEDIC Right 2016    hip replacement    HX SHOULDER REPLACEMENT Left 2014    HX SHOULDER REPLACEMENT Right 2015    HX TONSILLECTOMY      HX UROLOGICAL  2012    right partial nephrectomy    MD EXCISION OF UVULA         Social History     Tobacco Use    Smoking status: Never Smoker    Smokeless tobacco: Never Used   Substance Use Topics    Alcohol use:  Yes     Alcohol/week: 0.0 standard drinks     Comment: rarely, nothing past 6 months        Family History   Problem Relation Age of Onset    Hypertension Mother     Cancer Father     Heart Disease Father     Hypertension Father     Stroke Father         Review of Systems (15 point ROS):    Gen:   negative except for fatigue  Eyes:  negative  ENT:    negative  Resp:  negative  CVS:   negative  GI:       nausea, abdominal pain, hematochezia and denies vomiting  :     negative  Skin:   negative  Hem:   negative  MS:     negative  Fadi:    negative   Psy:    negative  Endo:  negative  ALL:   negative         Objective:      Visit Vitals  /61 (BP 1 Location: Right upper arm, BP Patient Position: Sitting)   Pulse (!) 112   Temp 97.1 °F (36.2 °C)   Resp 18   Ht 6' (1.829 m)   Wt 83.4 kg (183 lb 13.8 oz)   SpO2 96%   BMI 24.94 kg/m²       Exam:     Physical Exam:    General: alert, well appearing and no distress  Head: Normocephalic, without obvious abnormality, atraumatic  Eyes: PERRL, EOMI, anicteric sclerae and conjuntiva clear  ENT: Lips, mucosa, and tongue normal.   Neck: normal, supple and no tenderness  Lungs: clear to auscultation with good breath sounds and normal respiratory effort  Heart: S1, S2 normal, regular rate and regular rhythm  Abd: not distended, soft, RLQ tenderness to palpation, no guarding, no rebound, BS present and normactive  Ext: no cyanosis and no edema  Pulses: present 2+ and symmetric  Skin: normal skin color, no rashes and texture normal  Neuro:  alert, oriented x 3, no defects noted in general exam.  Psych: not anxious, cooperative, appropriate affect    Labs:    Recent Labs     08/07/21  1740 WBC 8.6   HGB 13.5   HCT 41.3        Recent Labs     08/07/21  1740      K 4.4      CO2 30   *   BUN 22*   CREA 1.50*   CA 10.3*   MG 2.7*   ALB 3.7   TBILI 0.5   ALT 30       Radiology:    CT ABD PELV W CONT    Result Date: 8/7/2021  Imaging findings suggestive of possible focus of active extravasation in the exam. This can be confirmed if not clinically defined by performance of a CTA for GI bleed. Colonic diverticulosis without evidence of diverticulitis. Large hiatal hernia. Hepatic steatosis. Indeterminate right renal lesion is stable compared to 3/24/2016 study likely benign. Postcholecystectomy. . Please see above for additional nonemergent incidental findings. I personally reviewed and interpreted the imaging studies and agree with official reading. The chart, ER course, the above PMSFH, lab work, and radiological studies was reviewed by me on: August 7, 2021         Assessment/Plan:      GIB / RLQ pain:  Admit for further workup and treatment. NPO.  IVF. IV Protonix. Supportive care. Only mechanical DVT prophylaxis only due to bleeding. Transfuse PRBCs if needed. Labs:  Type & Screen, serial H&H, coags. Imaging:  CT scan already done. If cause of bleeding is not found consider Technetium 99m RBC scan. Consult(s):  Gastro     RE: Transfusion if needed:  I have discussed with patient In person the rationale for blood transfusion, its benefits in treating or preventing acute blood loss which could lead to fatigue, organ damage or death, and its risk which include: mild transfusion reactions, rare risk of blood borne infection, or more serious but rare allergic reactions. I have discussed the alternatives to transfusion, including the risk and consequences of not receiving transfusion. The patient had an opportunity to ask questions and had agreed to proceed with transfusion of packed red blood cells. LILI:   IVFs. Avoid nephrotoxic agents whenever possible.  I&Os. Check for post-void residuals and straight cath if needed. Labs and consider ultrasound. Consider nephrology consult. Labs: CMP, MAG, PO4, A1C, UA, Intact PTH, Vit.  D 1.25, and Erythropoeitin    Body mass index is 24.94 kg/m².:  18.5 - 24.9 Normal weight      Risk of deterioration: high      Discussed:  Code Status and Care Plan discussed with: Patient, ED Provider and RN    Prophylaxis:  SCD's and H2B/PPI    Probable disposition:  Home with family    Date of service:    8/7/2021                ___________________________________________________    Admitting Physician: Rosaura Brock MD

## 2021-08-08 NOTE — PROGRESS NOTES
8/8/2021  Reason for Admission:  Gastrointestinal bleeding                   RUR Score:         10% low            Plan for utilizing home health:    No history, patient unlikely to need at this admission      PCP: First and Last name:  Arjun Bingham DO   Name of Practice:    Are you a current patient: Yes/No: Yes   Approximate date of last visit: 4 months ago   Can you participate in a virtual visit with your PCP: Yes                    Current Advanced Directive/Advance Care Plan: Full Code    Healthcare Decision Maker:   Click here to complete [address] including selection of the Healthcare Decision Maker Relationship (ie \"Primary\")           Wife Laura Romero                  Transition of Care Plan:                    Patient lives with his wife in a 2 story home with a few steps to enter. Prior to admission patient is independent with ADLs and drives. No DME. Patient states he has had home health in the past following surgeries but it was several years ago. Patient's emergency contact is his wife Laura Romero and she can be reached at [phone]. She will be taking him home from the hospital when he is ready for discharge. Patient sees Dr. Martha Rodriguez as his PCP and last had a visit a few months ago. He does have prescription coverage under his insurance. Transition of Care Plan  1. Continue medical management/treatment  2. EGD/colonoscopy tomorrow  3. Home with family when ready   4. Wife will transport  5. Follow up with PCP, specialties as needed  6. CM will follow    Care Management Interventions  PCP Verified by CM: Yes (Dr. Martha Rodriguez)  Mode of Transport at Discharge:  Other (see comment) (family)  Transition of Care Consult (CM Consult): Discharge Planning  Current Support Network: Lives with Spouse  Confirm Follow Up Transport: Family  Discharge Location  Discharge Placement: Home with family assistance    LYNDA Leal

## 2021-08-09 ENCOUNTER — ANESTHESIA (OUTPATIENT)
Dept: ENDOSCOPY | Age: 76
DRG: 378 | End: 2021-08-09
Payer: COMMERCIAL

## 2021-08-09 ENCOUNTER — ANESTHESIA EVENT (OUTPATIENT)
Dept: ENDOSCOPY | Age: 76
DRG: 378 | End: 2021-08-09
Payer: COMMERCIAL

## 2021-08-09 VITALS
TEMPERATURE: 98.2 F | HEART RATE: 98 BPM | OXYGEN SATURATION: 96 % | BODY MASS INDEX: 24.9 KG/M2 | SYSTOLIC BLOOD PRESSURE: 134 MMHG | WEIGHT: 183.86 LBS | RESPIRATION RATE: 16 BRPM | HEIGHT: 72 IN | DIASTOLIC BLOOD PRESSURE: 67 MMHG

## 2021-08-09 LAB
1,25(OH)2D3 SERPL-MCNC: 48.4 PG/ML (ref 19.9–79.3)
ALBUMIN SERPL-MCNC: 3.3 G/DL (ref 3.5–5)
ALBUMIN/GLOB SERPL: 1.2 {RATIO} (ref 1.1–2.2)
ALP SERPL-CCNC: 66 U/L (ref 45–117)
ALT SERPL-CCNC: 26 U/L (ref 12–78)
ANION GAP SERPL CALC-SCNC: 3 MMOL/L (ref 5–15)
AST SERPL-CCNC: 17 U/L (ref 15–37)
BILIRUB SERPL-MCNC: 0.6 MG/DL (ref 0.2–1)
BUN SERPL-MCNC: 15 MG/DL (ref 6–20)
BUN/CREAT SERPL: 14 (ref 12–20)
CALCIUM SERPL-MCNC: 8.5 MG/DL (ref 8.5–10.1)
CHLORIDE SERPL-SCNC: 112 MMOL/L (ref 97–108)
CO2 SERPL-SCNC: 26 MMOL/L (ref 21–32)
COMMENT, HOLDF: NORMAL
CREAT SERPL-MCNC: 1.11 MG/DL (ref 0.7–1.3)
GLOBULIN SER CALC-MCNC: 2.7 G/DL (ref 2–4)
GLUCOSE SERPL-MCNC: 89 MG/DL (ref 65–100)
H PYLORI FROM TISSUE: NEGATIVE
HCT VFR BLD AUTO: 38.2 % (ref 36.6–50.3)
HGB BLD-MCNC: 12.2 G/DL (ref 12.1–17)
KIT LOT NO., HCLOLOT: NORMAL
NEGATIVE CONTROL: NEGATIVE
PHOSPHATE SERPL-MCNC: 2.9 MG/DL (ref 2.6–4.7)
POSITIVE CONTROL: POSITIVE
POTASSIUM SERPL-SCNC: 4 MMOL/L (ref 3.5–5.1)
PROT SERPL-MCNC: 6 G/DL (ref 6.4–8.2)
SAMPLES BEING HELD,HOLD: NORMAL
SODIUM SERPL-SCNC: 141 MMOL/L (ref 136–145)

## 2021-08-09 PROCEDURE — 74011250636 HC RX REV CODE- 250/636: Performed by: NURSE ANESTHETIST, CERTIFIED REGISTERED

## 2021-08-09 PROCEDURE — 0DBK8ZZ EXCISION OF ASCENDING COLON, VIA NATURAL OR ARTIFICIAL OPENING ENDOSCOPIC: ICD-10-PCS | Performed by: INTERNAL MEDICINE

## 2021-08-09 PROCEDURE — 76060000031 HC ANESTHESIA FIRST 0.5 HR: Performed by: INTERNAL MEDICINE

## 2021-08-09 PROCEDURE — 76040000019: Performed by: INTERNAL MEDICINE

## 2021-08-09 PROCEDURE — 74011250636 HC RX REV CODE- 250/636: Performed by: HOSPITALIST

## 2021-08-09 PROCEDURE — 85014 HEMATOCRIT: CPT

## 2021-08-09 PROCEDURE — 80053 COMPREHEN METABOLIC PANEL: CPT

## 2021-08-09 PROCEDURE — 87077 CULTURE AEROBIC IDENTIFY: CPT | Performed by: INTERNAL MEDICINE

## 2021-08-09 PROCEDURE — 88305 TISSUE EXAM BY PATHOLOGIST: CPT

## 2021-08-09 PROCEDURE — 0DB68ZX EXCISION OF STOMACH, VIA NATURAL OR ARTIFICIAL OPENING ENDOSCOPIC, DIAGNOSTIC: ICD-10-PCS | Performed by: INTERNAL MEDICINE

## 2021-08-09 PROCEDURE — 77030013992 HC SNR POLYP ENDOSC BSC -B: Performed by: INTERNAL MEDICINE

## 2021-08-09 PROCEDURE — 74011250637 HC RX REV CODE- 250/637: Performed by: INTERNAL MEDICINE

## 2021-08-09 PROCEDURE — 84100 ASSAY OF PHOSPHORUS: CPT

## 2021-08-09 PROCEDURE — 77030021593 HC FCPS BIOP ENDOSC BSC -A: Performed by: INTERNAL MEDICINE

## 2021-08-09 PROCEDURE — 74011000250 HC RX REV CODE- 250: Performed by: NURSE ANESTHETIST, CERTIFIED REGISTERED

## 2021-08-09 PROCEDURE — 36415 COLL VENOUS BLD VENIPUNCTURE: CPT

## 2021-08-09 PROCEDURE — 2709999900 HC NON-CHARGEABLE SUPPLY: Performed by: INTERNAL MEDICINE

## 2021-08-09 PROCEDURE — 74011250636 HC RX REV CODE- 250/636: Performed by: INTERNAL MEDICINE

## 2021-08-09 RX ORDER — MIDAZOLAM HYDROCHLORIDE 1 MG/ML
.25-5 INJECTION, SOLUTION INTRAMUSCULAR; INTRAVENOUS
Status: DISCONTINUED | OUTPATIENT
Start: 2021-08-09 | End: 2021-08-09 | Stop reason: HOSPADM

## 2021-08-09 RX ORDER — LIDOCAINE HYDROCHLORIDE 20 MG/ML
INJECTION, SOLUTION EPIDURAL; INFILTRATION; INTRACAUDAL; PERINEURAL AS NEEDED
Status: DISCONTINUED | OUTPATIENT
Start: 2021-08-09 | End: 2021-08-09 | Stop reason: HOSPADM

## 2021-08-09 RX ORDER — SODIUM CHLORIDE 9 MG/ML
50 INJECTION, SOLUTION INTRAVENOUS CONTINUOUS
Status: DISPENSED | OUTPATIENT
Start: 2021-08-09 | End: 2021-08-09

## 2021-08-09 RX ORDER — PROPOFOL 10 MG/ML
INJECTION, EMULSION INTRAVENOUS AS NEEDED
Status: DISCONTINUED | OUTPATIENT
Start: 2021-08-09 | End: 2021-08-09 | Stop reason: HOSPADM

## 2021-08-09 RX ORDER — FLUMAZENIL 0.1 MG/ML
0.2 INJECTION INTRAVENOUS
Status: DISCONTINUED | OUTPATIENT
Start: 2021-08-09 | End: 2021-08-09 | Stop reason: HOSPADM

## 2021-08-09 RX ORDER — PANTOPRAZOLE SODIUM 40 MG/1
40 TABLET, DELAYED RELEASE ORAL
Status: DISCONTINUED | OUTPATIENT
Start: 2021-08-09 | End: 2021-08-09 | Stop reason: HOSPADM

## 2021-08-09 RX ORDER — SODIUM CHLORIDE 9 MG/ML
INJECTION, SOLUTION INTRAVENOUS
Status: DISCONTINUED | OUTPATIENT
Start: 2021-08-09 | End: 2021-08-09 | Stop reason: HOSPADM

## 2021-08-09 RX ORDER — DEXTROMETHORPHAN/PSEUDOEPHED 2.5-7.5/.8
1.2 DROPS ORAL
Status: DISCONTINUED | OUTPATIENT
Start: 2021-08-09 | End: 2021-08-09 | Stop reason: HOSPADM

## 2021-08-09 RX ORDER — NALOXONE HYDROCHLORIDE 0.4 MG/ML
0.4 INJECTION, SOLUTION INTRAMUSCULAR; INTRAVENOUS; SUBCUTANEOUS
Status: DISCONTINUED | OUTPATIENT
Start: 2021-08-09 | End: 2021-08-09 | Stop reason: HOSPADM

## 2021-08-09 RX ORDER — FENTANYL CITRATE 50 UG/ML
100 INJECTION, SOLUTION INTRAMUSCULAR; INTRAVENOUS
Status: DISCONTINUED | OUTPATIENT
Start: 2021-08-09 | End: 2021-08-09 | Stop reason: HOSPADM

## 2021-08-09 RX ORDER — EPINEPHRINE 0.1 MG/ML
1 INJECTION INTRACARDIAC; INTRAVENOUS
Status: DISCONTINUED | OUTPATIENT
Start: 2021-08-09 | End: 2021-08-09 | Stop reason: HOSPADM

## 2021-08-09 RX ORDER — PROPOFOL 10 MG/ML
INJECTION, EMULSION INTRAVENOUS
Status: DISCONTINUED | OUTPATIENT
Start: 2021-08-09 | End: 2021-08-09 | Stop reason: HOSPADM

## 2021-08-09 RX ORDER — ATROPINE SULFATE 0.1 MG/ML
0.5 INJECTION INTRAVENOUS
Status: DISCONTINUED | OUTPATIENT
Start: 2021-08-09 | End: 2021-08-09 | Stop reason: HOSPADM

## 2021-08-09 RX ADMIN — PROPOFOL 30 MG: 10 INJECTION, EMULSION INTRAVENOUS at 07:26

## 2021-08-09 RX ADMIN — LIDOCAINE HYDROCHLORIDE 80 MG: 20 INJECTION, SOLUTION INTRAVENOUS at 07:22

## 2021-08-09 RX ADMIN — SODIUM CHLORIDE 125 ML/HR: 9 INJECTION, SOLUTION INTRAVENOUS at 05:51

## 2021-08-09 RX ADMIN — PROPOFOL 20 MG: 10 INJECTION, EMULSION INTRAVENOUS at 07:23

## 2021-08-09 RX ADMIN — PANTOPRAZOLE SODIUM 40 MG: 40 TABLET, DELAYED RELEASE ORAL at 09:25

## 2021-08-09 RX ADMIN — PROPOFOL 80 MG: 10 INJECTION, EMULSION INTRAVENOUS at 07:22

## 2021-08-09 RX ADMIN — Medication 10 ML: at 05:51

## 2021-08-09 RX ADMIN — SODIUM CHLORIDE: 9 INJECTION, SOLUTION INTRAVENOUS at 07:05

## 2021-08-09 RX ADMIN — SODIUM CHLORIDE 50 ML/HR: 9 INJECTION, SOLUTION INTRAVENOUS at 07:00

## 2021-08-09 RX ADMIN — PROPOFOL 40 MG: 10 INJECTION, EMULSION INTRAVENOUS at 07:25

## 2021-08-09 RX ADMIN — PROPOFOL INJECTABLE EMULSION 85 MCG/KG/MIN: 10 INJECTION, EMULSION INTRAVENOUS at 07:22

## 2021-08-09 RX ADMIN — LIDOCAINE HYDROCHLORIDE 20 MG: 20 INJECTION, SOLUTION INTRAVENOUS at 07:24

## 2021-08-09 NOTE — ADT AUTH CERT NOTES
NOTIFICATION OF EMERGENT INPATIENT ADMISSION   ADMITTED: 2021    PT STILL IN HOUSE     PLEASE BUILD AN AUTHORIZATION FOR THIS PATIENT     UR CONTACT : De Ector   [facility]AR[facility] FAX NUMBER: [phone]    PLEASE FAX BACK REFERENCE NUMBER, AUTH STATUS UPDATE(S) AND AND CLINICAL REQUESTS TO SECURE FAX NUMBER ABOVE. THANK YOU SO MUCH! [address] [place] [address] NPI : [id number]     [address]  [phone]            Patient Name :Kacey Bob   :  (75 yrs)  MRN : [id number]     Patient Mailing Address [address] [place] [address] [47] , [address]                                                             .         Insurance Plan Payor: Maribell Cleary / Plan: [address] [place] [address] / Product Type: PPO /      Primary Coverage Subscriber ID : [id number]     Secondary Coverage:  BLUE CROSS     Secondary Subscriber ID :  [id number]      Current Patient Class : INPATIENT  Admit Date : 2021     REQUESTED LEVEL OF CARE: INPATIENT [101]                                                           Diagnosis : GIB (gastrointestinal bleeding);RLQ abdominal pain;LILI (acute kidney injury) ([facility] [address] Excessive magnesium intake; Excessive intake of calcium                          ICD10 Code : GIB (gastrointestinal bleeding) [K92.2]  RLQ abdominal pain [R10.31]  LILI (acute kidney injury) ([facility] [address] [N17.9]  Excessive magnesium intake [R68.89]  Excessive intake of calcium [R63.8]       Admitting and Attending Info:  Admitting Provider : Shira Perales MD   NPI: [id number]  Admitting Provider Phone. [phone]  Admitting Provider Address: SAME AS FACILITY             Patient Demographics    Patient Name   Joesph Mace Legal Sex   Male    1945 Address   [address]  [address] Phone [phone] (Home)   [phone] (Mobile) *[facility] Account    Name Acct ID Class Status Primary Coverage   Fleeta Mcburney [id number] INPATIENT Discharged/Not Billed BLUE CROSS - [address] [facility]          Guarantor Account (for Hospital Account [de-identified])    Name Relation to Pt Service Area Active? Acct Type   Fleeta Mcburney Self [facility] Yes Personal/Family   Address Phone     [address]   Latosha Dominguez [phone](Z)            Coverage Information (for Hospital Account [de-identified])    1. BLUE CROSS/VA BLUE CROSS OUT OF STATE    F/O Payor/Plan Subscriber  Subscriber Sex Precert #   BLUE CROSS/VA BLUE CROSS OUT OF STATE 49 M    Subscriber Subscriber #   Fleeta Mcburney [id number]   Grp # Group Name   [id number]    Address Phone   [address] [employer]    Policy Number Status Effective Date Benefits Phone   [id number] -  -   Auth/Cert   REF# [id number]   2. BLUE CROSS/VA BLUE CROSS SUPPLEMENT MEDICARE    F/O Payor/Plan Subscriber  Subscriber Sex Precert #   BLUE CROSS/VA BLUE CROSS SUPPLEMENT MEDICARE 49 M    Subscriber Subscriber #   Fleeta Mcburney [id number]   Grp # Group Name   [id number] Medicare Supplement   Address Phone   [place] [address] [employer]    Policy Number Status Effective Date Benefits Phone   [id number] -  -   Auth/Cert             Diagnosis     Codes Comments   Gastrointestinal hemorrhage, unspecified gastrointestinal hemorrhage type  ICD-10-CM: K92.2   ICD-9-CM: 964. 9           Admission Information    Arrival Date/Time: 2021 1649 Admit Date/Time: 2021 1712 IP Adm.  Date/Time: 2021 210   Admission Type: Emergency Point of Origin: Non-health Care Facility/self Admit Category:    Means of Arrival: Car Primary Service: Medicine Secondary Service: N/A   Transfer Source:  Service Area: [place] Unit: [facility]  MED SURG 2   Admit Provider: Wilman Jarrett MD Attending Provider: Ann Park DO Referring Provider:    Admission Information    Attending Provider Admission Dx Admitted on    GIB (gastrointestinal bleeding), RLQ abdominal pain, LILI (acute kidney injury) ([facility] [address] Excessive magnesium intake, Excessive intake of calcium 21   Service Isolation Code Status   MEDICINE  Full Code   Allergies Advance Care Planning    No Known Allergies Jump to the Activity     Admission Information    Unit/Bed: [facility] 5M2 MED SURG  Service: MEDICINE   Admitting provider: Bushra French MD Phone: [phone]   Attending provider:  Phone:    PCP: Chalmer Cooks, DO Phone: [phone]   Admission dx:  Patient class: I   Admission type: ER     Patient Demographics    Patient Name   Rodo Valencia   [id number] Legal Sex   Male    1945 Address   [address] [place] [address] Phone   [phone] (Home)   [phone] (Mobile) *Preferred*   H&P Notes     H&P by Bushra French MD at 21 documented on ED to Hosp-Admission (Discharged) from 2021 in [facility] MED SURG 2  Author: Bushra French MD Author Type: Physician Filed: 21 [id number]   Note Status: Addendum Cosign: Cosign Not Required Date of Service: 21   : Bushra French MD (Physician)   Prior Versions: 1. Bushra Frnech MD (Physician) at 21 050 - Addendum    2. Bushra French MD (Physician) at 21 - Addendum    3.  Bushra French MD (Physician) at 21 4224 - [address]   [phone]     Hospitalist Admission Note        NAME:            Mari Calix   :               1945   MRN:               [id number]      Date/Time:      2021 8:47 PM     Patient PCP: Chalmer Cooks, DO     Emergency Contact:    Extended Emergency Contact Information  Primary Emergency Contact: Negrito Caruso  Address: [address] [place] [address] Latosha Dominguez [address] [facility] Phone: [phone]  Relation: Spouse       Code: FULL     Isolation Precautions: There are currently no Active Isolations         Subjective:      CHIEF COMPLAINT: Abdominal pain      HISTORY OF PRESENT ILLNESS:     Mr. Chapin Jean is a 70 y.o. male with history that includes HTN, kidney cancer, anxiety, and hemorrhoids presents with BRBPR for the past 5 days with a gradual onset of intermittent mild to moderate RLQ abdominal pain. The pain does not radiate. Describes it as dull and associated with dizziness, hematochezia, nausea and no bleeding. Appears to be due to RLQ bleed on CT scan but not specific. Not typical of his previous hemorrhoid bleeds. CBC/H&H appears stable but chemistry panel shows an an elevated BUN and creatinine.         No Known Allergies             Prior to Admission medications    Medication Sig Start Date End Date Taking? Authorizing Provider   traMADol (ULTRAM) 50 mg tablet Take 50 mg by mouth every six (6) hours as needed for Pain.       Provider, Historical   LORazepam (ATIVAN) 0.5 mg tablet Take 0.5 mg by mouth nightly as needed for Anxiety (1-2).       Provider, Historical   lisinopril (PRINIVIL, ZESTRIL) 20 mg tablet Take 20 mg by mouth daily.       Provider, Historical   HYDROcodone-acetaminophen (NORCO) 5-325 mg per tablet Take 1-2 Tabs by mouth every four (4) hours as needed for Pain. Max Daily Amount: 12 Tabs. 10/26/16     Nehemias Ross MD   bacitracin (BACITRACIN) ointment **To start after dressing removed (per Dr. Caballero Mealing)** 10/26/16     Nehemias Ross MD   ondansetron (ZOFRAN ODT) 4 mg disintegrating tablet Take 1 Tab by mouth every four (4) hours as needed for Nausea.  10/26/16     Nehemias Ross MD   doxycycline (ADOXA) 100 mg tablet Take 100 mg by mouth daily.       Provider, Historical   glucosamine-D3-Boswellia serr (OSTEO BI-FLEX, 5-LOXIN,) 1,500-400-100 mg-unit-mg tab Take 2 Tabs by mouth daily.       Provider, Historical   furosemide (LASIX) 40 mg tablet Take 20 mg by mouth every evening.       Provider, Historical   ranitidine (ZANTAC) 300 mg tablet Take 300 mg by mouth every evening.       Provider, Historical   SULFAMETHOXAZOLE-TRIMETHOPRIM PO Take 800 mg by mouth every morning.       Provider, Historical   doxazosin (CARDURA) 4 mg tablet Take 4 mg by mouth nightly.       Provider, Historical   DULOXETINE HCL, BULK, Take 90 mg by mouth every evening.       Provider, Historical   amlodipine (NORVASC) 5 mg tablet Take 5 mg by mouth nightly.       Other, MD Donnell              Past Medical History:   Diagnosis Date    Arthritis       marc shoulders and right hip    Cancer ([facility] [address]       kidney    GERD (gastroesophageal reflux disease)      Hemorrhoids      High cholesterol      Hypertension      Ill-defined condition       cataracts--no surgery yet    Other ill-defined conditions(799.89) 8/2012     right renal mass newly diagnosed    Other ill-defined conditions(799.89)       heart murmur    Prostate infection       resolved    Psychiatric disorder       panic attacks, last one 9/2013; anxiety    Unspecified sleep apnea 2006     surgery- excision of uvula; 2013 using dental mouthpiece               Past Surgical History:   Procedure Laterality Date    HX APPENDECTOMY         as a child    HX CHOLECYSTECTOMY   10/30/2013     lap choley    HX HEENT   2006     excision of uvula    HX HERNIA REPAIR         rih repair age 8 and again 5 years ago, LI 30 years ago   [facility] HX ORTHOPAEDIC   1998     right knee athroscopy    HX ORTHOPAEDIC Right 2016     hip replacement    HX SHOULDER REPLACEMENT Left 2014    HX SHOULDER REPLACEMENT Right 2015    HX TONSILLECTOMY        HX UROLOGICAL   2012     right partial nephrectomy    TX EXCISION OF UVULA             Social History            Tobacco Use    Smoking status: Never Smoker    Smokeless tobacco: Never Used   Substance Use Topics    Alcohol use:  Yes       Alcohol/week: 0.0 standard drinks       Comment: rarely, nothing past 6 months               Family History   Problem Relation Age of Onset    Hypertension Mother      Cancer Father      Heart Disease Father      Hypertension Father      Stroke Father           Review of Systems (14 point ROS):     Gen:   negative except for fatigue  Eyes:  negative  ENT:    negative  Resp:  negative  CVS:   negative  GI:       nausea, abdominal pain, hematochezia and denies vomiting  :      negative  Skin:   negative  Hem:   negative  MS:     negative  Fadi:    negative   Psy:    negative  Endo:  negative  ALL:    negative            Objective:       Visit Vitals  /61 (BP 1 Location: Right upper arm, BP Patient Position: Sitting)   Pulse (!) 112   Temp 97.1 °F (36.2 °C)   Resp 18   Ht 6' (1.829 m)   Wt 83.4 kg (183 lb 13.8 oz)   SpO2 96%   BMI 24.94 kg/m²         Exam:      Physical Exam:     General: alert, well appearing and no distress  Head: Normocephalic, without obvious abnormality, atraumatic  Eyes: PERRL, EOMI, anicteric sclerae and conjuntiva clear  ENT: Lips, mucosa, and tongue normal.   Neck: normal, supple and no tenderness  Lungs: clear to auscultation with good breath sounds and normal respiratory effort  Heart: S1, S2 normal, regular rate and regular rhythm  Abd: not distended, soft, RLQ tenderness to palpation, no guarding, no rebound, BS present and normactive  Ext: no cyanosis and no edema  Pulses: present 2+ and symmetric  Skin: normal skin color, no rashes and texture normal  Neuro:  alert, oriented x 3, no defects noted in general exam.  Psych: not anxious, cooperative, appropriate affect     Labs:         Recent Labs     08/07/21  1740   WBC 8.6   HGB 13.5   HCT 41.3             Recent Labs     08/07/21  1740      K 4.4      CO2 30   *   BUN 22*   CREA 1.50*   CA 10.3*   MG 2.7*   ALB 3.7   TBILI 0.5   ALT 30         Radiology:     CT ABD PELV W CONT     Result Date: 8/7/2021  Imaging findings suggestive of possible focus of active extravasation in the exam. This can be confirmed if not clinically defined by performance of a CTA for GI bleed. Colonic diverticulosis without evidence of diverticulitis. Large hiatal hernia. Hepatic steatosis. Indeterminate right renal lesion is stable compared to 3/24/2016 study likely benign. Postcholecystectomy. . Please see above for additional nonemergent incidental findings.       I personally reviewed and interpreted the imaging studies and agree with official reading.      The chart, ER course, the above PMSFH, lab work, and radiological studies was reviewed by me on: August 7, 2021            Assessment/Plan:       GIB / RLQ pain:  Admit for further workup and treatment. NPO.  IVF. IV Protonix. Supportive care. Only mechanical DVT prophylaxis only due to bleeding. Transfuse PRBCs if needed. Labs:  Type & Screen, serial H&H, coags. Imaging:  CT scan already done. If cause of bleeding is not found consider Technetium 99m RBC scan. Consult(s):  Gastro      RE: Transfusion if needed:  I have discussed with patient In person the rationale for blood transfusion, its benefits in treating or preventing acute blood loss which could lead to fatigue, organ damage or death, and its risk which include: mild transfusion reactions, rare risk of blood borne infection, or more serious but rare allergic reactions. I have discussed the alternatives to transfusion, including the risk and consequences of not receiving transfusion. The patient had an opportunity to ask questions and had agreed to proceed with transfusion of packed red blood cells.     LILI:   IVFs. Avoid nephrotoxic agents whenever possible.  I&Os. Check for post-void residuals and straight cath if needed. Labs and consider ultrasound. Consider nephrology consult. Labs: CMP, MAG, PO4, A1C, UA, Intact PTH, Vit.  D 1.25, and Erythropoeitin     Body mass index is 24.94 kg/m².:  18.5 - 24.9 Normal weight        Risk of deterioration: high            Discussed:  Code Status and Care Plan discussed with: Patient, ED Provider and RN     Prophylaxis:  SCD's and H2B/PPI     Probable disposition:  Home with family     Date of service:    2021                                                                                               ___________________________________________________     Admitting Physician:   Warren Metcalf MD          Patient Demographics    Patient Name   Jerardo Arellano   [id number] Legal Sex   Male    1945 Address   [address] [place] [address]   [address],  [address] [place] [address] Phone   [phone] (Home)   [phone] (Mobile) *Preferred*   CSN:   [id number]   6 [facility] Date: Admit Time Room Bed   Aug 7, 2021  5:12  [20808] 01 [70034]   Attending Providers    Provider Pager From To   Dariana Patel DO  21   Margaret Silverman MD  21   Emergency Contact(s)    Name Relation Home Work Mobile   Neisha Hoppero Spouse [phone]     Utilization Reviews       Gastrointestinal Bleeding, Lower - Care Day 2 (2021) by Galileo Holder       Review Entered Review Status   2021 11:17 Completed      Criteria Review      Care Day: 2 Care Date: 2021 Level of Care: Inpatient Floor    Guideline Day 2    Level Of Care    (X) Floor    2021 11:17:18 EDT by Don, [address] [facility] [facility] floor    Clinical Status    (X) * Hypotension absent    2021 11:17:18 EDT by Galileo Holder      T 98.5    HR 97, 100    103/66    18    94% RA    (X) * Bleeding absent or reduced    2021 11:17:18 EDT by Galileo Holder      no bleeding since admit    Routes    ( ) * Oral hydration tolerated    2021 11:17:18 EDT by Galileo Holder      NS 125mL/hr    ( ) * Oral diet tolerated    2021 11:17:18 EDT by Galileo Holder      Bowel prep - Miralax 17g PO x8, Dulcolax 10mg PO x3  NPO    Interventions    (X) Hgb/Hct    2021 11:17:18 EDT by Galileo Holder      H&H q6  WBC 5.7, HGB 12.0, HCT 37.6, Plat 200  Ch 109, BUN 20/ Cr 1.24, GFR 57  Vitamin D 25-Hydroxy: 25.9 (L)    * Milestone   Additional Notes   08/08/21 - Inpt      GI Consult:   Chief Complaint: Gastrointestinal Bleeding   70year old male who reports having had colonoscopy a few years ago but I can't find results here or at [facility].  Presents because of red blood in stool, mixed in stool, intermittently for 2 days.  CT done but not CTA protocol, notes some tracer extravasation in RLQ.  Hgb is 12      Assessment/Plan:     GIB (gastrointestinal bleeding)      RLQ abdominal pain     LILI (acute kidney injury)      Hematochezia   Bilateral lower quadrant pain       Prep for EGD/colonsocopy tomorrow.               --Progress Note:   Assessment / Plan:       LGIB: Hb minimally dropped; no bleeding since admit. CT w/ suggested extravasation RLQ. Pt has had hemorrhoidal bleeding but described this as more brisk. Suspect diverticular origin.                - Serial H/H               - IV access, MIVF               - NPO while awaiting GI thoughts on scope       RLQ Pain: Resolved. No diverticulitis on imaging; diverticulosis not painful. I suppose ischemic injury could cause this but less likely. Regardless, essentially resolved at this time       LILI: Pre-renal. Improving with IVF         Gastrointestinal Bleeding, Lower - Care Day 1 (8/7/2021) by William Beck       Review Entered Review Status   8/8/2021 16:58 Completed      Criteria Review      Care Day: 1 Care Date: 8/7/2021 Level of Care: Inpatient Floor    Guideline Day 1    Level Of Care    (X) ICU or floor    Clinical Status    (X) * Clinical Indications met    8/8/2021 16:56:23 EDT by [facility] admit dx GIB, RLQ abdominal pain, LILI, Excessive magnesium intake, Excessive intake of calcium    Activity    (X) Activity as tolerated    Routes    (X) NPO    (X) IV fluids    8/8/2021 16:58:35 EDT by William Beck       ml/hr cont.     (X) IV medications 8/8/2021 16:58:35 EDT by Lucille Mckeon      pepcid 20 mg IV x1 (q24hrs)    ( ) Possible liquid diet in evening    8/8/2021 16:56:48 EDT by Thomas Elizabeth clears    Interventions    (X) Hgb/Hct    8/8/2021 16:57:57 EDT by Lucille Mckeon      13.5, 41.3    (X) Colonoscopy or upper GI endoscopy    (X) Monitor vital signs and blood counts closely    8/8/2021 16:57:57 EDT by Thomas Elizabeth serial H&H, coags; vitals per unit routine    (X) Transfusion as necessary    8/8/2021 16:58:09 EDT by Lucille Mckeon      Transfuse PRBCs if needed    * Milestone   Additional Notes   8/7:   subjective:     presents with BRBPR for the past 5 days with a gradual onset of intermittent mild to moderate RLQ abdominal pain.  The pain does not radiate. Describes it as dull and associated with dizziness, hematochezia, nausea       physical exam:    General: alert, well appearing and no distress   Head: Normocephalic, without obvious abnormality, atraumatic   Eyes: PERRL, EOMI, anicteric sclerae and conjuntiva clear   ENT: Lips, mucosa, and tongue normal.    Neck: normal, supple and no tenderness   Lungs: clear to auscultation with good breath sounds and normal respiratory effort   Heart: S1, S2 normal, regular rate and regular rhythm   Abd: not distended, soft, RLQ tenderness to palpation, no guarding, no rebound, BS present and normactive   Ext: no cyanosis and no edema   Pulses: present 2+ and symmetric   Skin: normal skin color, no rashes and texture normal   Neuro:  alert, oriented x 3, no defects noted in general exam.   Psych: not anxious, cooperative, appropriate affect       results not prev listed:    BUN 22    Cr 1.50    ca 10.3    mag 2.7    gfr 46    CT a/p: Imaging findings suggestive of possible focus of active extravasation in the   exam.   This can be confirmed if not clinically defined by performance of a CTA for GI   bleed. Colonic diverticulosis without evidence of diverticulitis. Large hiatal hernia. Hepatic steatosis. Indeterminate right renal lesion is stable compared to 3/24/2016 study likely   benign. Postcholecystectomy. . Please see above for additional nonemergent incidental   findings.        vitals:    97.1    118/61    90    18    97%ra        IM Assessment/Plan:   GIB / RLQ pain: Admit for further workup and treatment. NPO.  IVF. IV Protonix.   Supportive care.  Only mechanical DVT prophylaxis only due to bleeding.  Transfuse PRBCs if needed. Labs:  Type & Screen, serial H&H, coags. Imaging:  CT scan already done.  If cause of bleeding is not found consider Technetium 99m RBC scan. Consult(s):  Gastro       LILI:  IVFs. Avoid nephrotoxic agents whenever possible.  I&Os.   Check for post-void residuals and straight cath if needed.  Labs and consider ultrasound. Consider nephrology consult. Labs: CMP, MAG, PO4, A1C, UA, Intact PTH, Vit.  D 1.25, and Erythropoeitin      Gastrointestinal Bleeding, Lower - Clinical Indications for Admission to Inpatient Care by Marylene Forte       Review Entered Review Status   8/8/2021 16:55 Completed      Criteria Review      Clinical Indications for Admission to Inpatient Care    Most Recent : Marylene Forte Most Recent Date: 8/8/2021 16:55:34 EDT    (X) Admission is indicated for  1 or more  of the following  (1) (2) (3) (4) (5) (6):       (X) Ongoing active bleeding (eg, decreasing hematocrit)       8/8/2021 16:55:34 EDT by Marylene Forte

## 2021-08-09 NOTE — ANESTHESIA POSTPROCEDURE EVALUATION
Procedure(s):  ESOPHAGOGASTRODUODENOSCOPY (EGD)  COLONOSCOPY  ESOPHAGOGASTRODUODENAL (EGD) BIOPSY  ENDOSCOPIC POLYPECTOMY. MAC    Anesthesia Post Evaluation        Patient location during evaluation: bedside  Patient participation: complete - patient participated  Level of consciousness: awake and alert  Pain management: adequate  Airway patency: patent  Anesthetic complications: no  Cardiovascular status: acceptable  Respiratory status: acceptable  Hydration status: acceptable  Post anesthesia nausea and vomiting:  none  Final Post Anesthesia Temperature Assessment:  Normothermia (36.0-37.5 degrees C)      INITIAL Post-op Vital signs:   Vitals Value Taken Time   /67 08/09/21 0753   Temp 37 °C (98.6 °F) 08/09/21 0750   Pulse 94 08/09/21 0756   Resp 17 08/09/21 0756   SpO2 93 % 08/09/21 0756   Vitals shown include unvalidated device data.

## 2021-08-09 NOTE — ANESTHESIA PREPROCEDURE EVALUATION
Relevant Problems   CARDIOVASCULAR   (+) HTN (hypertension)      RENAL FAILURE   (+) LILI (acute kidney injury) ([facility] [address]   (+) Renal cell carcinoma (HCC)      PERSONAL HX & FAMILY HX OF CANCER   (+) Renal cell carcinoma (HCC)       Anesthetic History   No history of anesthetic complications            Review of Systems / Medical History  Patient summary reviewed and pertinent labs reviewed    Pulmonary        Sleep apnea: No treatment        Comments: Uses mouth device for GIOVANNI   Neuro/Psych              Cardiovascular    Hypertension          Hyperlipidemia    Exercise tolerance: >4 METS     GI/Hepatic/Renal     GERD           Endo/Other        Arthritis     Other Findings              Physical Exam    Airway  Mallampati: II  TM Distance: 4 - 6 cm  Neck ROM: normal range of motion   Mouth opening: Normal     Cardiovascular    Rhythm: regular  Rate: normal         Dental  No notable dental hx       Pulmonary  Breath sounds clear to auscultation               Abdominal  GI exam deferred       Other Findings            Anesthetic Plan    ASA: 3  Anesthesia type: MAC          Induction: Intravenous  Anesthetic plan and risks discussed with: Patient

## 2021-08-09 NOTE — PROGRESS NOTES
8/9/2021   CARE MANAGEMENT NOTE:  CM reviewed EMR for clinical updates. Pt was admitted with GIB. Reportedly, pt resides with his wife, Brian Chester ([phone]). RUR     Transition Plan of Care:  1.  GI following for medical management  2. Plan is for pt to return home with his wife; no anticipated post discharge needs at this writing  3. Outpt f/u  4. Wife will transport pt home    CM will continue to follow pt until discharged.   Kel

## 2021-08-09 NOTE — PROGRESS NOTES
Stefano Velasquez  1945  [id number]    Situation:  Verbal report received from: Red Berman RN   Procedure: Procedure(s) with comments:  ESOPHAGOGASTRODUODENOSCOPY (EGD)  COLONOSCOPY  ESOPHAGOGASTRODUODENAL (EGD) BIOPSY - H pylori  ENDOSCOPIC POLYPECTOMY    Background:    Preoperative diagnosis: anemia  Postoperative diagnosis: Hiatal hernia w/erosion, diverticulosis, polyp, hemorrhoids. :  Dr. Jd Richard  Assistant(s): Endoscopy Technician-1: Alejandro Stringer  Endoscopy RN-1: Antonietta Hermosillo RN    Specimens:   ID Type Source Tests Collected by Time Destination   1 : ascending polyp Preservative Colon, Ascending  Lester Pham MD 8/9/2021 1540 Pathology     H. Pylori  yes    Assessment:    Anesthesia gave intra-procedure sedation and medications, see anesthesia flow sheet no    Intravenous fluids: NS@ K[facility]     Vital signs stable     Abdominal assessment: round and soft     Recommendation:  Discharge patient per MD order.   Return to floor  Family or Friend   Permission to share finding with family or friend yes

## 2021-08-09 NOTE — PERIOP NOTES
0630    TRANSFER - IN REPORT:    Verbal report received from Jessica Payne RN on Francis Tomlinson  being received from [id number] for ordered procedure      Report consisted of patients Situation, Background, Assessment and   Recommendations(SBAR). Information from the following report(s) SBAR and Recent Results was reviewed with the receiving nurse. Opportunity for questions and clarification was provided. Assessment completed upon patients arrival to unit and care assumed.     0005  Anesthesia staff at patient's bedside administering anesthesia and monitoring patients vital signs throughout procedure. See anesthesia note. Post procedure, report received from Nanette SHORE. [id number]  Endoscope was pre-cleaned at bedside immediately following procedure by endo Toby alexandre. [id number]  Patient tolerated procedure. Abdomen soft and patient arousable and voices no complaints. Patient transported to endoscopy recovery area. Report given to post procedure RNJuan Pablo. 0800  TRANSFER - OUT REPORT:    Verbal report given to Sandy Pompa RN on Francis Tomlinson  being transferred to [id number] for routine progression of care       Report consisted of patients Situation, Background, Assessment and   Recommendations(SBAR). Information from the following report(s) SBAR and Procedure Summary was reviewed with the receiving nurse. Lines:   Peripheral IV 08/09/21 Posterior;Right Hand (Active)   Site Assessment Clean, dry, & intact 08/09/21 0712   Phlebitis Assessment 0 08/09/21 0712   Dressing Status Clean, dry, & intact 08/09/21 0712   Dressing Type Tape 08/09/21 0712   Hub Color/Line Status Pink; Infusing 08/09/21 9599   Action Taken Open ports on tubing capped 08/09/21 0712   Alcohol Cap Used Yes 08/09/21 7436        Opportunity for questions and clarification was provided. Patient transported with:   Patient chart. Patent R hand IV.  (RAC dc'd)

## 2021-08-09 NOTE — PROGRESS NOTES
Bedside, Verbal and Written shift change report given to Tiffanie RN (oncoming nurse) by Bhupinder Harvey RN (offgoing nurse). Report included the following information SBAR, Kardex, ED Summary, OR Summary, Procedure Summary, Intake/Output, MAR, Recent Results and Med Rec Status.

## 2021-08-09 NOTE — DISCHARGE SUMMARY
Physician Discharge Summary     Patient ID:  Kameron Valencia  [id number]  39 y.o.  1945    Admit date: 8/7/2021    Discharge date and time: 8/9/2021    Admission Diagnoses: GIB (gastrointestinal bleeding) [K92.2]  RLQ abdominal pain [R10.31]  LILI (acute kidney injury) ([address] [N17.9]  Excessive magnesium intake [R68.89]  Excessive intake of calcium [R63.8]    Discharge Diagnoses: Active Problems:    GIB (gastrointestinal bleeding) (8/7/2021)      RLQ abdominal pain (8/7/2021)      LILI (acute kidney injury) ([address] (8/7/2021)           Hospital Course:   Mr. Zachariah Delatorre is a 70 y.o. male with history that includes HTN, kidney cancer, anxiety, and hemorrhoids presents with BRBPR for the past 5 days with a gradual onset of intermittent mild to moderate RLQ abdominal pain. The pain does not radiate. Describes it as dull and associated with dizziness, hematochezia, nausea. He was admitted for treatment of the following:       #LGIB: Hb minimally dropped; no bleeding since admit. CT w/ suggested extravasation RLQ. Pt has had hemorrhoidal bleeding but described this as more brisk. Suspected diverticular origin confirmed on colonoscopy. #RLQ Pain: Resolved. No diverticulitis on imaging; diverticulosis not painful. I suppose ischemic injury could cause this but less likely.  Regardless, resolved at this time     #LILI: Pre-renal. Improved with IVF      PCP: Nicolas Chirinos DO     Consults: GI    Condition of patient at discharge: good and improved    Discharge Exam:    Physical Exam:    Gen: Well-developed, well-nourished, in no acute distress  HEENT:  Pink conjunctivae, PERRL, hearing intact to voice, moist mucous membranes  Neck: Supple, without masses, thyroid non-tender  Resp: No accessory muscle use, clear breath sounds without wheezes rales or rhonchi  Card: No murmurs, normal S1, S2 without thrills, bruits or peripheral edema  Abd:  Soft, non-tender, non-distended, normoactive bowel sounds are present, no palpable organomegaly and no detectable hernias  Lymph:  No cervical or inguinal adenopathy  Musc: No cyanosis or clubbing  Skin: No rashes or ulcers, skin turgor is good  Neuro:  Cranial nerves are grossly intact, no focal motor weakness, follows commands appropriately  Psych:  Good insight, oriented to person, place and time, alert          Disposition: home    Patient Instructions:   Current Discharge Medication List      CONTINUE these medications which have NOT CHANGED    Details   multivitamin (ONE A DAY) tablet Take 1 Tablet by mouth daily. ferrous sulfate 325 mg (65 mg iron) tablet Take 65 mg by mouth daily. folic acid 184 mcg tablet Take 400 mg by mouth daily. pantoprazole (Protonix) 20 mg tablet Take 40 mg by mouth daily. magnesium 250 mg tab Take 250 mg by mouth daily. traMADol (ULTRAM) 50 mg tablet Take 50 mg by mouth every six (6) hours as needed for Pain. lisinopril (PRINIVIL, ZESTRIL) 20 mg tablet Take 20 mg by mouth daily. doxycycline (ADOXA) 100 mg tablet Take 100 mg by mouth daily. DULOXETINE HCL, BULK, Take 90 mg by mouth every evening. Activity: Activity as tolerated  Diet: Regular Diet  Wound Care: None needed    Follow-up with Donal Kuhn DO in 1 week. Follow-up tests/labs None    Approximate time spent in patient care on day of discharge: 35 min    Signed:   Jolynn Thompson MD  8/9/2021  12:30 PM

## 2021-08-09 NOTE — PROGRESS NOTES
Discharge orders in. Discharge documents, education, and instructions given to patient and verbalized understanding. IV cannula aseptically removed. Intact. No pain. No distress. Waiting for family for transport.

## 2021-08-10 LAB — EPO SERPL-ACNC: 9.2 MIU/ML (ref 2.6–18.5)

## 2021-08-10 NOTE — ADT AUTH CERT NOTES
NOTIFICATION OF EMERGENT INPATIENT ADMISSION   ADMITTED: 2021    PT STILL IN HOUSE     PLEASE BUILD AN AUTHORIZATION FOR THIS PATIENT IF NEEDED- SECONDARY PAYOR - ID # [id number]    IF NO AUTH REQ- PLEASE FAX BACK YANG [phone]    UR CONTACT : SEBASTIAN KENNEDY   UR FAX NUMBER: [phone]    PLEASE FAX BACK REFERENCE NUMBER, AUTH STATUS UPDATE(S) AND AND CLINICAL REQUESTS TO SECURE FAX NUMBER ABOVE. THANK YOU SO MUCH! [address]      FACILITY NPI : [id number]     [address]  [phone]            Patient Name :Harjit Hodges   :  (75 yrs)  MRN : [id number]     Patient Mailing Address [address] [place] [address] [] , [address]                                                             .         Insurance Plan Payor: Dimitri Lyman / Plan: [address] [place] [address] / Product Type: PPO /      Primary Coverage Subscriber ID : [id number]     Secondary Coverage:  BLUE CROSS     Secondary Subscriber ID :  [id number]      Current Patient Class : INPATIENT  Admit Date : 2021     REQUESTED LEVEL OF CARE: INPATIENT [101]                                                           Diagnosis : GIB (gastrointestinal bleeding);RLQ abdominal pain;LILI (acute kidney injury) ([facility] Excessive magnesium intake; Excessive intake of calcium                          ICD10 Code : GIB (gastrointestinal bleeding) [K92.2]  RLQ abdominal pain [R10.31]  LILI (acute kidney injury) ([facility] [N17.9]  Excessive magnesium intake [R68.89]  Excessive intake of calcium [R63.8]       Admitting and Attending Info:  Admitting Provider : Trice Beebe MD   NPI: [id number]  Admitting Provider Phone. [phone]  Admitting Provider Address: SAME AS FACILITY             Patient Demographics    Patient Name   Lakisha Augustin Weston Older Legal Sex   Male    1945 Address    [facility] [address] Phone   [phone] [facility]   [phone] (Mobile) *[facility] Account    Name Acct ID Class Status Primary Coverage   Anali Dietrich [id number] INPATIENT Discharged/Not Billed BLUE CROSS - [facility]          Guarantor Account (for Hospital Account [de-identified])    Name Relation to Pt Service Area Active? Acct Type   Anali Dietrich Self [facility] Yes Personal/Family   Address Phone     [address]   [place] Dominguez [phone](O)            Coverage Information (for Hospital Account [de-identified])    1. [place]OSS/[place] BLUE [place] OUT OF STATE    F/O Payor/Plan Subscriber  Subscriber Sex Precert #   BLUE CROSS/[place] BLUE CROSS OUT OF STATE 49 M    Subscriber Subscriber #   Anali Dietrich [id number]   Grp # Group Name   [id number]    Address Phone   [address] [employer]    Policy Number Status Effective Date Benefits Phone   [id number] -  -   Auth/Cert   REF# [id number]   2. BLUE CROSS/VA BLUE CROSS SUPPLEMENT MEDICARE    F/O Payor/Plan Subscriber  Subscriber Sex Precert #   BLUE CROSS/[place] BLUE CROSS SUPPLEMENT MEDICARE 49 M    Subscriber Subscriber #   Anali Dietrich [id number]   Grp # Group Name   [id number]  Medicare Supplement   Address Phone   Annie Crenshaw [address] [employer]    Policy Number Status Effective Date Benefits Phone   [id number] -  -   Auth/Cert             Diagnosis     Codes Comments   Gastrointestinal hemorrhage, unspecified gastrointestinal hemorrhage type  ICD-10-CM: K92.2   ICD-9-CM: 863. 9           Admission Information    Arrival Date/Time: 2021 1649 Admit Date/Time: 2021 1712 IP Adm.  Date/Time: 2021 2101   Admission Type: Emergency Point of Origin: Non-health Care Facility/self Admit Category:    Means of Arrival: Car Primary Service: Medicine Secondary Service: N/A   Transfer Source:  Service Area: [facility] WEST Unit: [facility]  MED SURG 2   Admit Provider: Ly Davis MD Attending Provider: Casey Salas DO Referring Provider:    Admission Information    Attending Provider Admission Dx Admitted on    GIB (gastrointestinal bleeding), RLQ abdominal pain, LILI (acute kidney injury) ([facility] Excessive magnesium intake, Excessive intake of calcium 21   Service Isolation Code Status   MEDICINE  Full Code   Allergies Advance Care Planning    No Known Allergies Jump to the Activity     Admission Information    Unit/Bed: [facility] 5M2 MED SURG  Service: MEDICINE   Admitting provider: Ly Davis MD Phone: [phone]   Attending provider:  Phone:    PCP: Ishmael Oliver DO Phone: [phone]   Admission dx:  Patient class: I   Admission type: ER     Patient Demographics    Patient Name   Rochelle Molina   [id number] Legal Sex   Male    1945 Address   [address] [place] [address] Phone   [phone] (Home)   [phone] (Mobile) *Preferred*   H&P Notes     H&P by Ly Davis MD at 21 documented on ED to Hosp-Admission (Discharged) from 2021 in [facility] MED SURG 2  Author: Ly Davis MD Author Type: Physician Filed: 21 8099   Note Status: Addendum Cosign: Cosign Not Required Date of Service: 21   : Ly Davis MD (Physician)   Prior Versions: 1. Ly Davis MD (Physician) at 21 0500 - Addendum    2. Ly Davis MD (Physician) at 21 0020 - Addendum    3.  Ly Davis MD (Physician) at 21 4170 - [address]   [phone]     Hospitalist Admission Note        NAME:            Kacey Bob   :               1945   MRN:               [id number]      Date/Time:      2021 8:47 PM     Patient PCP: Ishmael Oliver DO     Emergency Contact:    Extended Emergency Contact Information  Primary Emergency Contact: [facility]  Address: [address] Phone: [phone]  Relation: Spouse       Code: FULL     Isolation Precautions: There are currently no Active Isolations         Subjective:      CHIEF COMPLAINT: Abdominal pain      HISTORY OF PRESENT ILLNESS:     Mr. Deanna You is a 70 y.o. male with history that includes HTN, kidney cancer, anxiety, and hemorrhoids presents with BRBPR for the past 5 days with a gradual onset of intermittent mild to moderate RLQ abdominal pain. The pain does not radiate. Describes it as dull and associated with dizziness, hematochezia, nausea and no bleeding. Appears to be due to RLQ bleed on CT scan but not specific. Not typical of his previous hemorrhoid bleeds. CBC/H&H appears stable but chemistry panel shows an an elevated BUN and creatinine.         No Known Allergies             Prior to Admission medications    Medication Sig Start Date End Date Taking? Authorizing Provider   traMADol (ULTRAM) 50 mg tablet Take 50 mg by mouth every six (6) hours as needed for Pain.       Provider, Historical   LORazepam (ATIVAN) 0.5 mg tablet Take 0.5 mg by mouth nightly as needed for Anxiety (1-2).       Provider, Historical   lisinopril (PRINIVIL, ZESTRIL) 20 mg tablet Take 20 mg by mouth daily.       Provider, Historical   HYDROcodone-acetaminophen (NORCO) 5-325 mg per tablet Take 1-2 Tabs by mouth every four (4) hours as needed for Pain. Max Daily Amount: 12 Tabs. 10/26/16     Joellen Vazquez MD   bacitracin (BACITRACIN) ointment **To start after dressing removed (per Dr. Oliva Gallo)** 10/26/16     Joellen Vazquez MD   ondansetron (ZOFRAN ODT) 4 mg disintegrating tablet Take 1 Tab by mouth every four (4) hours as needed for Nausea.  10/26/16     Joellen Vazquez MD   doxycycline (ADOXA) 100 mg tablet Take 100 mg by mouth daily.       Provider, Historical glucosamine-D3-Boswellia serr (OSTEO BI-FLEX, 5-LOXIN,) 1,500-400-100 mg-unit-mg tab Take 2 Tabs by mouth daily.       Provider, Historical   furosemide (LASIX) 40 mg tablet Take 20 mg by mouth every evening.       Provider, Historical   ranitidine (ZANTAC) 300 mg tablet Take 300 mg by mouth every evening.       Provider, Historical   SULFAMETHOXAZOLE-TRIMETHOPRIM PO Take 800 mg by mouth every morning.       Provider, Historical   doxazosin (CARDURA) 4 mg tablet Take 4 mg by mouth nightly.       Provider, Historical   DULOXETINE HCL, BULK, Take 90 mg by mouth every evening.       Provider, Historical   amlodipine (NORVASC) 5 mg tablet Take 5 mg by mouth nightly.       Other, MD Donnell              Past Medical History:   Diagnosis Date    Arthritis       marc shoulders and right hip    Cancer (HCC)       kidney    GERD (gastroesophageal reflux disease)      Hemorrhoids      High cholesterol      Hypertension      Ill-defined condition       cataracts--no surgery yet    Other ill-defined conditions(799.89) 8/2012     right renal mass newly diagnosed    Other ill-defined conditions(799.89)       heart murmur    Prostate infection       resolved    Psychiatric disorder       panic attacks, last one 9/2013; anxiety    Unspecified sleep apnea 2006     surgery- excision of uvula; 2013 using dental mouthpiece               Past Surgical History:   Procedure Laterality Date    HX APPENDECTOMY         as a child    HX CHOLECYSTECTOMY   10/30/2013     lap ramsesey    HX HEENT   2006     excision of uvula    HX HERNIA REPAIR         rih repair age 8 and again 5 years ago, LIH 30 years ago    HX ORTHOPAEDIC   1998     right knee athroscopy    HX ORTHOPAEDIC Right 2016     hip replacement    HX SHOULDER REPLACEMENT Left 2014    HX SHOULDER REPLACEMENT Right 2015    HX TONSILLECTOMY        HX UROLOGICAL   2012     right partial nephrectomy    NV EXCISION OF UVULA             Social History            Tobacco Use    Smoking status: Never Smoker    Smokeless tobacco: Never Used   Substance Use Topics    Alcohol use:  Yes       Alcohol/week: 0.0 standard drinks       Comment: rarely, nothing past 6 months               Family History   Problem Relation Age of Onset    Hypertension Mother      Cancer Father      Heart Disease Father      Hypertension Father      Stroke Father           Review of Systems (14 point ROS):     Gen:   negative except for fatigue  Eyes:  negative  ENT:    negative  Resp:  negative  CVS:   negative  GI:       nausea, abdominal pain, hematochezia and denies vomiting  :      negative  Skin:   negative  Hem:   negative  MS:     negative  Fadi:    negative   Psy:    negative  Endo:  negative  ALL:    negative            Objective:       Visit Vitals  /61 (BP 1 Location: Right upper arm, BP Patient Position: Sitting)   Pulse (!) 112   Temp 97.1 °F (36.2 °C)   Resp 18   Ht 6' (1.829 m)   Wt 83.4 kg (183 lb 13.8 oz)   SpO2 96%   BMI 24.94 kg/m²         Exam:      Physical Exam:     General: alert, well appearing and no distress  Head: Normocephalic, without obvious abnormality, atraumatic  Eyes: PERRL, EOMI, anicteric sclerae and conjuntiva clear  ENT: Lips, mucosa, and tongue normal.   Neck: normal, supple and no tenderness  Lungs: clear to auscultation with good breath sounds and normal respiratory effort  Heart: S1, S2 normal, regular rate and regular rhythm  Abd: not distended, soft, RLQ tenderness to palpation, no guarding, no rebound, BS present and normactive  Ext: no cyanosis and no edema  Pulses: present 2+ and symmetric  Skin: normal skin color, no rashes and texture normal  Neuro:  alert, oriented x 3, no defects noted in general exam.  Psych: not anxious, cooperative, appropriate affect     Labs:         Recent Labs     08/07/21  1740   WBC 8.6   HGB 13.5   HCT 41.3             Recent Labs     08/07/21  1740      K 4.4      CO2 30   *   BUN 22*   CREA 1.50*   CA 10.3*   MG 2.7*   ALB 3.7   TBILI 0.5   ALT 30         Radiology:     CT ABD PELV W CONT     Result Date: 8/7/2021  Imaging findings suggestive of possible focus of active extravasation in the exam. This can be confirmed if not clinically defined by performance of a CTA for GI bleed. Colonic diverticulosis without evidence of diverticulitis. Large hiatal hernia. Hepatic steatosis. Indeterminate right renal lesion is stable compared to 3/24/2016 study likely benign. Postcholecystectomy. . Please see above for additional nonemergent incidental findings.       I personally reviewed and interpreted the imaging studies and agree with official reading.      The chart, ER course, the above PMSFH, lab work, and radiological studies was reviewed by me on: August 7, 2021            Assessment/Plan:       GIB / RLQ pain:  Admit for further workup and treatment. NPO.  IVF. IV Protonix. Supportive care. Only mechanical DVT prophylaxis only due to bleeding. Transfuse PRBCs if needed. Labs:  Type & Screen, serial H&H, coags. Imaging:  CT scan already done. If cause of bleeding is not found consider Technetium 99m RBC scan. Consult(s):  Gastro      RE: Transfusion if needed:  I have discussed with patient In person the rationale for blood transfusion, its benefits in treating or preventing acute blood loss which could lead to fatigue, organ damage or death, and its risk which include: mild transfusion reactions, rare risk of blood borne infection, or more serious but rare allergic reactions. I have discussed the alternatives to transfusion, including the risk and consequences of not receiving transfusion. The patient had an opportunity to ask questions and had agreed to proceed with transfusion of packed red blood cells.     LILI:   IVFs. Avoid nephrotoxic agents whenever possible.  I&Os. Check for post-void residuals and straight cath if needed. Labs and consider ultrasound. Consider nephrology consult.   Labs: CMP, MAG, PO4, A1C, UA, Intact PTH, Vit.  D 1.25, and Erythropoeitin     Body mass index is 24.94 kg/m².:  18.5 - 24.9 Normal weight        Risk of deterioration: high            Discussed:  Code Status and Care Plan discussed with: Patient, ED Provider and RN     Prophylaxis:  SCD's and H2B/PPI     Probable disposition:  Home with family     Date of service:    2021                                                                                               ___________________________________________________     Admitting Physician:   Cam Sargent MD          Patient Demographics    Patient Name   Dulce Hadley   [id number] Legal Sex   Male    1945 Address   [address],  [address] [place] [address] Phone   [phone] (Home)   [phone] (Mobile) *Preferred*   CSN:   [id number]   Admit Date: Admit Time Room Bed   Aug 7, 2021  5:12  [[id number]] 01 [92509]   Attending Providers    Provider Pager From To   Yany Lomeli DO  21   Brad Dunlap MD  21   Emergency Contact(s)    Name Relation Home Work Mobile   Neisha Garcia Spouse [phone]     Utilization Reviews       Gastrointestinal Bleeding, Lower - Care Day 2 (2021) by Chris Archibald       Review Entered Review Status   2021 11:17 Completed      Criteria Review      Care Day: 2 Care Date: 2021 Level of Care: Inpatient Floor    Guideline Day 2    Level Of Care    (X) Floor    2021 11:17:18 EDT by Don, [facility] floor    Clinical Status    (X) * Hypotension absent    2021 11:17:18 EDT by Chris Archibald      T 98.5    HR 97, 100    103/66    18    94% RA    (X) * Bleeding absent or reduced    2021 11:17:18 EDT by Chris Archibald      no bleeding since admit    Routes    ( ) * Oral hydration tolerated    2021 11:17:18 EDT by Chris Archibald      NS 125mL/hr    ( ) * Oral diet tolerated    2021 11:17:18 EDT by Chris Archibald      Bowel prep - Miralax 17g PO x8, Dulcolax 10mg PO x3  NPO    Interventions    (X) Hgb/Hct    8/9/2021 11:17:18 EDT by Nelda Leal      H&H q6  WBC 5.7, HGB 12.0, HCT 37.6, Plat 200  Ch 109, BUN 20/ Cr 1.24, GFR 57  Vitamin D 25-Hydroxy: 25.9 (L)    * Milestone   Additional Notes   08/08/21 - Inpt      GI Consult:   Chief Complaint: Gastrointestinal Bleeding   70year old male who reports having had colonoscopy a few years ago but I can't find results here or at [facility].  Presents because of red blood in stool, mixed in stool, intermittently for 2 days.  CT done but not CTA protocol, notes some tracer extravasation in RLQ.  Hgb is 12      Assessment/Plan:     GIB (gastrointestinal bleeding)      RLQ abdominal pain     LILI (acute kidney injury)      Hematochezia   Bilateral lower quadrant pain       Prep for EGD/colonsocopy tomorrow.               --Progress Note:   Assessment / Plan:       LGIB: Hb minimally dropped; no bleeding since admit. CT w/ suggested extravasation RLQ. Pt has had hemorrhoidal bleeding but described this as more brisk. Suspect diverticular origin.                - Serial H/H               - IV access, MIVF               - NPO while awaiting GI thoughts on scope       RLQ Pain: Resolved. No diverticulitis on imaging; diverticulosis not painful. I suppose ischemic injury could cause this but less likely.  Regardless, essentially resolved at this time       LILI: Pre-renal. Improving with IVF         Gastrointestinal Bleeding, Lower - Care Day 1 (8/7/2021) by Kamaljit Spence       Review Entered Review Status   8/8/2021 16:58 Completed      Criteria Review      Care Day: 1 Care Date: 8/7/2021 Level of Care: Inpatient Floor    Guideline Day 1    Level Of Care    (X) ICU or floor    Clinical Status    (X) * Clinical Indications met    8/8/2021 16:56:23 EDT by Angeli Cano admit dx GIB, RLQ abdominal pain, LILI, Excessive magnesium intake, Excessive intake of calcium    Activity    (X) Activity as tolerated    Routes    (X) NPO (X) IV fluids    8/8/2021 16:58:35 EDT by Sasha Newberry       ml/hr cont. (X) IV medications    8/8/2021 16:58:35 EDT by Sasha Newberry      pepcid 20 mg IV x1 (q24hrs)    ( ) Possible liquid diet in evening    8/8/2021 16:56:48 EDT by Alexandro Baeza clears    Interventions    (X) Hgb/Hct    8/8/2021 16:57:57 EDT by Sasha Newberry      13.5, 41.3    (X) Colonoscopy or upper GI endoscopy    (X) Monitor vital signs and blood counts closely    8/8/2021 16:57:57 EDT by Alexandro Baeza serial H&H, coags; vitals per unit routine    (X) Transfusion as necessary    8/8/2021 16:58:09 EDT by Sasha Newberry      Transfuse PRBCs if needed    * Milestone   Additional Notes   8/7:   subjective:     presents with BRBPR for the past 5 days with a gradual onset of intermittent mild to moderate RLQ abdominal pain.  The pain does not radiate.  Describes it as dull and associated with dizziness, hematochezia, nausea       physical exam:    General: alert, well appearing and no distress   Head: Normocephalic, without obvious abnormality, atraumatic   Eyes: PERRL, EOMI, anicteric sclerae and conjuntiva clear   ENT: Lips, mucosa, and tongue normal.    Neck: normal, supple and no tenderness   Lungs: clear to auscultation with good breath sounds and normal respiratory effort   Heart: S1, S2 normal, regular rate and regular rhythm   Abd: not distended, soft, RLQ tenderness to palpation, no guarding, no rebound, BS present and normactive   Ext: no cyanosis and no edema   Pulses: present 2+ and symmetric   Skin: normal skin color, no rashes and texture normal   Neuro:  alert, oriented x 3, no defects noted in general exam.   Psych: not anxious, cooperative, appropriate affect       results not prev listed:    BUN 22    Cr 1.50    ca 10.3    mag 2.7    gfr 46    CT a/p: Imaging findings suggestive of possible focus of active extravasation in the   exam.   This can be confirmed if not clinically defined by performance of a CTA for GI   bleed. Colonic diverticulosis without evidence of diverticulitis. Large hiatal hernia. Hepatic steatosis. Indeterminate right renal lesion is stable compared to 3/24/2016 study likely   benign. Postcholecystectomy. . Please see above for additional nonemergent incidental   findings.        vitals:    97.1    118/61    90    18    97%ra        IM Assessment/Plan:   GIB / RLQ pain: Admit for further workup and treatment. NPO.  IVF. IV Protonix.   Supportive care.  Only mechanical DVT prophylaxis only due to bleeding.  Transfuse PRBCs if needed. Labs:  Type & Screen, serial H&H, coags. Imaging:  CT scan already done.  If cause of bleeding is not found consider Technetium 99m RBC scan. Consult(s):  Gastro       LILI:  IVFs. Avoid nephrotoxic agents whenever possible.  I&Os.   Check for post-void residuals and straight cath if needed.  Labs and consider ultrasound. Consider nephrology consult. Labs: CMP, MAG, PO4, A1C, UA, Intact PTH, Vit.  D 1.25, and Erythropoeitin      Gastrointestinal Bleeding, Lower - Clinical Indications for Admission to Inpatient Care by Lucille Mckeon       Review Entered Review Status   8/8/2021 16:55 Completed      Criteria Review      Clinical Indications for Admission to Inpatient Care    Most Recent : Lucille Mckeon Most Recent Date: 8/8/2021 16:55:34 EDT    (X) Admission is indicated for  1 or more  of the following  (1) (2) (3) (4) (5) (6):       (X) Ongoing active bleeding (eg, decreasing hematocrit)       8/8/2021 16:55:34 EDT by Lucille Mckeon

## 2022-03-18 PROBLEM — R10.31 RLQ ABDOMINAL PAIN: Status: ACTIVE | Noted: 2021-08-07

## 2022-03-18 PROBLEM — R63.8: Status: ACTIVE | Noted: 2021-08-07

## 2022-03-19 PROBLEM — N17.9 AKI (ACUTE KIDNEY INJURY) (HCC): Status: ACTIVE | Noted: 2021-08-07

## 2022-03-19 PROBLEM — R68.89: Status: ACTIVE | Noted: 2021-08-07

## 2022-03-19 PROBLEM — K92.2 GIB (GASTROINTESTINAL BLEEDING): Status: ACTIVE | Noted: 2021-08-07

## 2022-11-18 ENCOUNTER — APPOINTMENT (OUTPATIENT)
Dept: CT IMAGING | Age: 77
DRG: 482 | End: 2022-11-18
Attending: EMERGENCY MEDICINE
Payer: COMMERCIAL

## 2022-11-18 ENCOUNTER — APPOINTMENT (OUTPATIENT)
Dept: GENERAL RADIOLOGY | Age: 77
DRG: 482 | End: 2022-11-18
Attending: PHYSICIAN ASSISTANT
Payer: COMMERCIAL

## 2022-11-18 ENCOUNTER — APPOINTMENT (OUTPATIENT)
Dept: GENERAL RADIOLOGY | Age: 77
DRG: 482 | End: 2022-11-18
Attending: EMERGENCY MEDICINE
Payer: COMMERCIAL

## 2022-11-18 ENCOUNTER — HOSPITAL ENCOUNTER (INPATIENT)
Age: 77
LOS: 4 days | Discharge: HOME HEALTH | DRG: 482 | End: 2022-11-22
Attending: EMERGENCY MEDICINE | Admitting: HOSPITALIST
Payer: COMMERCIAL
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
## 2022-11-18 DIAGNOSIS — S72.002A CLOSED FRACTURE OF LEFT HIP, INITIAL ENCOUNTER (HCC): ICD-10-CM

## 2022-11-18 PROBLEM — S72.009A HIP FRACTURE (HCC): Status: ACTIVE | Noted: 2022-11-18

## 2022-11-18 LAB
ABO + RH BLD: NORMAL
ALBUMIN SERPL-MCNC: 4.1 G/DL (ref 3.5–5)
ALBUMIN/GLOB SERPL: 1.5 {RATIO} (ref 1.1–2.2)
ALP SERPL-CCNC: 75 U/L (ref 45–117)
ALT SERPL-CCNC: 28 U/L (ref 12–78)
ANION GAP SERPL CALC-SCNC: 8 MMOL/L (ref 5–15)
AST SERPL-CCNC: 18 U/L (ref 15–37)
BASOPHILS # BLD: 0 K/UL (ref 0–0.1)
BASOPHILS NFR BLD: 0 % (ref 0–1)
BILIRUB SERPL-MCNC: 0.8 MG/DL (ref 0.2–1)
BLOOD GROUP ANTIBODIES SERPL: NORMAL
BUN SERPL-MCNC: 21 MG/DL (ref 6–20)
BUN/CREAT SERPL: 18 (ref 12–20)
CALCIUM SERPL-MCNC: 9.8 MG/DL (ref 8.5–10.1)
CHLORIDE SERPL-SCNC: 105 MMOL/L (ref 97–108)
CO2 SERPL-SCNC: 26 MMOL/L (ref 21–32)
COMMENT, HOLDF: NORMAL
CREAT SERPL-MCNC: 1.2 MG/DL (ref 0.7–1.3)
DIFFERENTIAL METHOD BLD: ABNORMAL
EOSINOPHIL # BLD: 0 K/UL (ref 0–0.4)
EOSINOPHIL NFR BLD: 0 % (ref 0–7)
ERYTHROCYTE [DISTWIDTH] IN BLOOD BY AUTOMATED COUNT: 12.8 % (ref 11.5–14.5)
GLOBULIN SER CALC-MCNC: 2.7 G/DL (ref 2–4)
GLUCOSE SERPL-MCNC: 102 MG/DL (ref 65–100)
HCT VFR BLD AUTO: 44.1 % (ref 36.6–50.3)
HGB BLD-MCNC: 14.5 G/DL (ref 12.1–17)
IMM GRANULOCYTES # BLD AUTO: 0.2 K/UL (ref 0–0.04)
IMM GRANULOCYTES NFR BLD AUTO: 1 % (ref 0–0.5)
LYMPHOCYTES # BLD: 0.7 K/UL (ref 0.8–3.5)
LYMPHOCYTES NFR BLD: 4 % (ref 12–49)
MAGNESIUM SERPL-MCNC: 2.2 MG/DL (ref 1.6–2.4)
MCH RBC QN AUTO: 29.1 PG (ref 26–34)
MCHC RBC AUTO-ENTMCNC: 32.9 G/DL (ref 30–36.5)
MCV RBC AUTO: 88.4 FL (ref 80–99)
MONOCYTES # BLD: 1.2 K/UL (ref 0–1)
MONOCYTES NFR BLD: 7 % (ref 5–13)
NEUTS SEG # BLD: 14.9 K/UL (ref 1.8–8)
NEUTS SEG NFR BLD: 88 % (ref 32–75)
NRBC # BLD: 0 K/UL (ref 0–0.01)
NRBC BLD-RTO: 0 PER 100 WBC
PLATELET # BLD AUTO: 257 K/UL (ref 150–400)
PMV BLD AUTO: 10.7 FL (ref 8.9–12.9)
POTASSIUM SERPL-SCNC: 3.9 MMOL/L (ref 3.5–5.1)
PROT SERPL-MCNC: 6.8 G/DL (ref 6.4–8.2)
RBC # BLD AUTO: 4.99 M/UL (ref 4.1–5.7)
RBC MORPH BLD: ABNORMAL
SAMPLES BEING HELD,HOLD: NORMAL
SODIUM SERPL-SCNC: 139 MMOL/L (ref 136–145)
SPECIMEN EXP DATE BLD: NORMAL
WBC # BLD AUTO: 17 K/UL (ref 4.1–11.1)

## 2022-11-18 PROCEDURE — 74011000250 HC RX REV CODE- 250: Performed by: INTERNAL MEDICINE

## 2022-11-18 PROCEDURE — 73502 X-RAY EXAM HIP UNI 2-3 VIEWS: CPT

## 2022-11-18 PROCEDURE — 71045 X-RAY EXAM CHEST 1 VIEW: CPT

## 2022-11-18 PROCEDURE — G0378 HOSPITAL OBSERVATION PER HR: HCPCS

## 2022-11-18 PROCEDURE — 86900 BLOOD TYPING SEROLOGIC ABO: CPT

## 2022-11-18 PROCEDURE — 74011250637 HC RX REV CODE- 250/637: Performed by: EMERGENCY MEDICINE

## 2022-11-18 PROCEDURE — 74011250637 HC RX REV CODE- 250/637: Performed by: INTERNAL MEDICINE

## 2022-11-18 PROCEDURE — 80053 COMPREHEN METABOLIC PANEL: CPT

## 2022-11-18 PROCEDURE — 74011250636 HC RX REV CODE- 250/636: Performed by: INTERNAL MEDICINE

## 2022-11-18 PROCEDURE — 99285 EMERGENCY DEPT VISIT HI MDM: CPT

## 2022-11-18 PROCEDURE — 74011250636 HC RX REV CODE- 250/636: Performed by: EMERGENCY MEDICINE

## 2022-11-18 PROCEDURE — 96374 THER/PROPH/DIAG INJ IV PUSH: CPT

## 2022-11-18 PROCEDURE — 83735 ASSAY OF MAGNESIUM: CPT

## 2022-11-18 PROCEDURE — 96375 TX/PRO/DX INJ NEW DRUG ADDON: CPT

## 2022-11-18 PROCEDURE — 73700 CT LOWER EXTREMITY W/O DYE: CPT

## 2022-11-18 PROCEDURE — 36415 COLL VENOUS BLD VENIPUNCTURE: CPT

## 2022-11-18 PROCEDURE — 85025 COMPLETE CBC W/AUTO DIFF WBC: CPT

## 2022-11-18 PROCEDURE — 65270000029 HC RM PRIVATE

## 2022-11-18 RX ORDER — MORPHINE SULFATE 4 MG/ML
4 INJECTION INTRAVENOUS
Status: DISCONTINUED | OUTPATIENT
Start: 2022-11-18 | End: 2022-11-22 | Stop reason: HOSPADM

## 2022-11-18 RX ORDER — OXYCODONE AND ACETAMINOPHEN 5; 325 MG/1; MG/1
1 TABLET ORAL
Status: DISCONTINUED | OUTPATIENT
Start: 2022-11-18 | End: 2022-11-22 | Stop reason: HOSPADM

## 2022-11-18 RX ORDER — IBUPROFEN 800 MG/1
800 TABLET ORAL
Status: COMPLETED | OUTPATIENT
Start: 2022-11-18 | End: 2022-11-18

## 2022-11-18 RX ORDER — ASPIRIN 81 MG/1
81 TABLET ORAL DAILY
COMMUNITY

## 2022-11-18 RX ORDER — SODIUM CHLORIDE 0.9 % (FLUSH) 0.9 %
5-40 SYRINGE (ML) INJECTION AS NEEDED
Status: DISCONTINUED | OUTPATIENT
Start: 2022-11-18 | End: 2022-11-22 | Stop reason: HOSPADM

## 2022-11-18 RX ORDER — ONDANSETRON 2 MG/ML
4 INJECTION INTRAMUSCULAR; INTRAVENOUS
Status: DISCONTINUED | OUTPATIENT
Start: 2022-11-18 | End: 2022-11-22 | Stop reason: HOSPADM

## 2022-11-18 RX ORDER — ACETAMINOPHEN 650 MG/1
650 SUPPOSITORY RECTAL
Status: DISCONTINUED | OUTPATIENT
Start: 2022-11-18 | End: 2022-11-22 | Stop reason: HOSPADM

## 2022-11-18 RX ORDER — OXYCODONE AND ACETAMINOPHEN 5; 325 MG/1; MG/1
1 TABLET ORAL
Status: COMPLETED | OUTPATIENT
Start: 2022-11-18 | End: 2022-11-18

## 2022-11-18 RX ORDER — ENOXAPARIN SODIUM 100 MG/ML
40 INJECTION SUBCUTANEOUS DAILY
Status: DISCONTINUED | OUTPATIENT
Start: 2022-11-19 | End: 2022-11-22 | Stop reason: HOSPADM

## 2022-11-18 RX ORDER — ACETAMINOPHEN 325 MG/1
650 TABLET ORAL
Status: DISCONTINUED | OUTPATIENT
Start: 2022-11-18 | End: 2022-11-22 | Stop reason: HOSPADM

## 2022-11-18 RX ORDER — SODIUM CHLORIDE 0.9 % (FLUSH) 0.9 %
5-40 SYRINGE (ML) INJECTION EVERY 8 HOURS
Status: DISCONTINUED | OUTPATIENT
Start: 2022-11-18 | End: 2022-11-22 | Stop reason: HOSPADM

## 2022-11-18 RX ORDER — ONDANSETRON 4 MG/1
4 TABLET, ORALLY DISINTEGRATING ORAL
Status: DISCONTINUED | OUTPATIENT
Start: 2022-11-18 | End: 2022-11-22 | Stop reason: HOSPADM

## 2022-11-18 RX ORDER — POLYETHYLENE GLYCOL 3350 17 G/17G
17 POWDER, FOR SOLUTION ORAL DAILY PRN
Status: DISCONTINUED | OUTPATIENT
Start: 2022-11-18 | End: 2022-11-22 | Stop reason: HOSPADM

## 2022-11-18 RX ADMIN — MORPHINE SULFATE 4 MG: 4 INJECTION INTRAVENOUS at 20:02

## 2022-11-18 RX ADMIN — OXYCODONE AND ACETAMINOPHEN 1 TABLET: 5; 325 TABLET ORAL at 16:19

## 2022-11-18 RX ADMIN — IBUPROFEN 800 MG: 800 TABLET, FILM COATED ORAL at 16:18

## 2022-11-18 RX ADMIN — ONDANSETRON 4 MG: 2 INJECTION INTRAMUSCULAR; INTRAVENOUS at 20:02

## 2022-11-18 RX ADMIN — MORPHINE SULFATE 4 MG: 4 INJECTION INTRAVENOUS at 23:34

## 2022-11-18 RX ADMIN — SODIUM CHLORIDE, PRESERVATIVE FREE 10 ML: 5 INJECTION INTRAVENOUS at 23:34

## 2022-11-18 RX ADMIN — OXYCODONE AND ACETAMINOPHEN 1 TABLET: 5; 325 TABLET ORAL at 21:53

## 2022-11-18 RX ADMIN — SODIUM CHLORIDE 500 ML: 9 INJECTION, SOLUTION INTRAVENOUS at 20:05

## 2022-11-18 NOTE — ED PROVIDER NOTES
Patient is a pleasant 82-year-old male with past medical history significant for arthritis, status post right hip replacement by [address] [place] [address] in the past.  Also has history of hyperlipidemia, hypertension but denies history of coronary artery disease. He states he was riding on his horse today and started to dismount noting that the saddle was a little bit loose. He states he thought he got his foot out of the stirrup but apparently had not which prompted him to fall onto his left side. Denies any headache or loss of consciousness. He states he was wearing his helmet. Denies being on blood thinners. Denies any chest, abdomen or back pain. No pain of his upper extremities. No pain to the right leg. He states he is having pain in the left hip pelvis area that is worse when laying down. He states the pain was pretty significant in the ambulance on the way here and asked to be able to sit in the chair instead.        Past Medical History:   Diagnosis Date    Arthritis     marc shoulders and right hip    Cancer ([facility] [address]     kidney, (surgery to remove 1/4 of right kidney.)    GERD (gastroesophageal reflux disease)     Hemorrhoids     High cholesterol     Hypertension     Ill-defined condition     cataracts--no surgery yet    Other ill-defined conditions(799.89) 8/2012    right renal mass newly diagnosed    Other ill-defined conditions(799.89)     heart murmur    Prostate infection     resolved    Psychiatric disorder     panic attacks, last one 9/2013; anxiety    Unspecified sleep apnea 2006    surgery- excision of uvula; 2013 using dental mouthpiece       Past Surgical History:   Procedure Laterality Date    COLONOSCOPY N/A 8/9/2021    COLONOSCOPY performed by Jon Khan MD at [address]      as a child    HX CHOLECYSTECTOMY  10/30/2013    lap choley    HX HEENT  2006    excision of uvula    HX HERNIA REPAIR      rih repair age 8 and again 5 years ago, LIH 27 years ago    HX ORTHOPAEDIC 1998    right knee athroscopy    HX ORTHOPAEDIC Right 2016    hip replacement    HX SHOULDER REPLACEMENT Left 2014    HX SHOULDER REPLACEMENT Right 2015    HX TONSILLECTOMY      HX UROLOGICAL  2012    right partial nephrectomy    [place] EXCISION OF UVULA           Family History:   Problem Relation Age of Onset    Hypertension Mother     Cancer Father     Heart Disease Father     Hypertension Father     Stroke Father        Social History     Socioeconomic History    Marital status:      Spouse name: Not on file    Number of children: Not on file    Years of education: Not on file    Highest education level: Not on file   Occupational History    Not on file   Tobacco Use    Smoking status: Never    Smokeless tobacco: Never   Substance and Sexual Activity    Alcohol use: Yes     Alcohol/week: 0.0 standard drinks     Comment: rarely, nothing past 6 months    Drug use: No    Sexual activity: Not on file   Other Topics Concern    Not on file   Social History Narrative    Not on file     Social Determinants of Health     Financial Resource Strain: Not on file   Food Insecurity: Not on file   Transportation Needs: Not on file   Physical Activity: Not on file   Stress: Not on file   Social Connections: Not on file   Intimate Partner Violence: Not on file   Housing Stability: Not on file         ALLERGIES: Patient has no known allergies. Review of Systems   Constitutional:  Negative for chills and fever. HENT:  Negative for drooling. Eyes:  Negative for photophobia. Respiratory:  Negative for shortness of breath. Cardiovascular:  Negative for chest pain. Gastrointestinal:  Negative for abdominal pain, nausea and vomiting. Musculoskeletal:  Positive for arthralgias. Negative for back pain and neck pain. Skin:  Negative for wound. Neurological:  Negative for seizures and syncope. Hematological:  Does not bruise/bleed easily. Psychiatric/Behavioral: Negative.        Vitals:    11/19/22 0446 11/19/22 7629 11/19/22 1304 11/19/22 1526   BP: 124/77 136/75 (!) 141/77 (!) 149/81   Pulse: 80 83 97 100   Resp: 18 20 18 20   Temp: 97.8 °F (36.6 °C) 98 °F (36.7 °C) 97.6 °F (36.4 °C) 97.4 °F (36.3 °C)   SpO2: 91% 90% 90% 92%   Weight:       Height:                Physical Exam  Vitals and nursing note reviewed. Constitutional:       Appearance: He is not toxic-appearing or diaphoretic. HENT:      Head: Normocephalic and atraumatic. Right Ear: External ear normal.      Left Ear: External ear normal.      Nose: Nose normal.   Eyes:      General: No scleral icterus. Conjunctiva/sclera: Conjunctivae normal.   Neck:      Trachea: Trachea and phonation normal.   Cardiovascular:      Rate and Rhythm: Normal rate. Pulses: Normal pulses. Heart sounds: No friction rub. Gallop present. Pulmonary:      Effort: Pulmonary effort is normal.      Breath sounds: Normal breath sounds. Chest:      Chest wall: No tenderness. Abdominal:      Palpations: Abdomen is soft. Tenderness: There is no guarding or rebound. Musculoskeletal:         General: Tenderness present. Cervical back: Neck supple. No tenderness. No spinous process tenderness or muscular tenderness. Right hip: No deformity. Left hip: Bony tenderness present. Decreased range of motion. Right knee: No deformity. Normal range of motion. Left knee: No deformity. Normal range of motion. Right lower leg: No deformity, tenderness or bony tenderness. No edema. Left lower leg: No deformity, tenderness or bony tenderness. No edema. Right ankle: Normal range of motion. Normal pulse. Left ankle: Normal range of motion. Normal pulse. Legs:    Skin:     General: Skin is warm and dry. Neurological:      Mental Status: He is alert and oriented to person, place, and time. Psychiatric:         Mood and Affect: Mood normal.         Behavior: Behavior normal.         Thought Content:  Thought content normal.         Judgment: Judgment normal.        MDM     Amount and/or Complexity of Data Reviewed  Clinical lab tests: ordered  Tests in the radiology section of CPT®: ordered and reviewed  Discuss the patient with other providers: yes           Procedures        Perfect Serve Consult for Admission  6:33 PM    ED Room Number: 405/01  Patient Name and age:  Mervin Bonner 78 y.o.  male  Working Diagnosis:   1. Fall from horse, initial encounter    2. Closed fracture of left hip, initial encounter ([facility] [address]        COVID-19 Suspicion:  no  Sepsis present:  no  Reassessment needed: no  Code Status:  Full Code  Readmission: no  Isolation Requirements:  no  Recommended Level of Care:  med/surg  Department:[facility] ED - [phone]  Other: Is a pleasant 21-year-old male who presents emergency department after fall from his horse due to getting his foot stuck in the stirrup and having a somewhat loose saddle landing on his left side and resulting in a left impacted femoral neck fracture. Orthopedics is consulted and will see patient. Patient denies any cardiac history or other complaint.

## 2022-11-18 NOTE — ED TRIAGE NOTES
Pt was getting off horse and his foot got caught in the stirrup. Pt fell on his left hip. Pt has pain with weightbearing and movement.

## 2022-11-19 ENCOUNTER — ANESTHESIA (OUTPATIENT)
Dept: SURGERY | Age: 77
DRG: 482 | End: 2022-11-19
Payer: COMMERCIAL

## 2022-11-19 ENCOUNTER — APPOINTMENT (OUTPATIENT)
Dept: GENERAL RADIOLOGY | Age: 77
DRG: 482 | End: 2022-11-19
Attending: INTERNAL MEDICINE
Payer: COMMERCIAL

## 2022-11-19 ENCOUNTER — ANESTHESIA EVENT (OUTPATIENT)
Dept: SURGERY | Age: 77
DRG: 482 | End: 2022-11-19
Payer: COMMERCIAL

## 2022-11-19 ENCOUNTER — APPOINTMENT (OUTPATIENT)
Dept: GENERAL RADIOLOGY | Age: 77
DRG: 482 | End: 2022-11-19
Attending: PHYSICIAN ASSISTANT
Payer: COMMERCIAL

## 2022-11-19 PROCEDURE — 73552 X-RAY EXAM OF FEMUR 2/>: CPT

## 2022-11-19 PROCEDURE — C1769 GUIDE WIRE: HCPCS | Performed by: ORTHOPAEDIC SURGERY

## 2022-11-19 PROCEDURE — 74011000250 HC RX REV CODE- 250: Performed by: NURSE ANESTHETIST, CERTIFIED REGISTERED

## 2022-11-19 PROCEDURE — 74011000250 HC RX REV CODE- 250: Performed by: ANESTHESIOLOGY

## 2022-11-19 PROCEDURE — 74011250636 HC RX REV CODE- 250/636: Performed by: NURSE ANESTHETIST, CERTIFIED REGISTERED

## 2022-11-19 PROCEDURE — G0378 HOSPITAL OBSERVATION PER HR: HCPCS

## 2022-11-19 PROCEDURE — 77030000032 HC CUF TRNQT ZIMM -B: Performed by: ORTHOPAEDIC SURGERY

## 2022-11-19 PROCEDURE — 74011000250 HC RX REV CODE- 250: Performed by: INTERNAL MEDICINE

## 2022-11-19 PROCEDURE — 77030013079 HC BLNKT BAIR HGGR 3M -A: Performed by: ANESTHESIOLOGY

## 2022-11-19 PROCEDURE — 76210000017 HC OR PH I REC 1.5 TO 2 HR: Performed by: ORTHOPAEDIC SURGERY

## 2022-11-19 PROCEDURE — C1713 ANCHOR/SCREW BN/BN,TIS/BN: HCPCS | Performed by: ORTHOPAEDIC SURGERY

## 2022-11-19 PROCEDURE — 77030002933 HC SUT MCRYL J&J -A: Performed by: ORTHOPAEDIC SURGERY

## 2022-11-19 PROCEDURE — 0QS704Z REPOSITION LEFT UPPER FEMUR WITH INTERNAL FIXATION DEVICE, OPEN APPROACH: ICD-10-PCS | Performed by: ORTHOPAEDIC SURGERY

## 2022-11-19 PROCEDURE — 74011250637 HC RX REV CODE- 250/637: Performed by: INTERNAL MEDICINE

## 2022-11-19 PROCEDURE — 77030007866 HC KT SPN ANES BBMI -B: Performed by: ANESTHESIOLOGY

## 2022-11-19 PROCEDURE — 2709999900 HC NON-CHARGEABLE SUPPLY: Performed by: ORTHOPAEDIC SURGERY

## 2022-11-19 PROCEDURE — 76010000161 HC OR TIME 1 TO 1.5 HR INTENSV-TIER 1: Performed by: ORTHOPAEDIC SURGERY

## 2022-11-19 PROCEDURE — 77030020274 HC MISC IMPL ORTHOPEDIC: Performed by: ORTHOPAEDIC SURGERY

## 2022-11-19 PROCEDURE — 77030020788: Performed by: ORTHOPAEDIC SURGERY

## 2022-11-19 PROCEDURE — 65270000029 HC RM PRIVATE

## 2022-11-19 PROCEDURE — 77030033067 HC SUT PDO STRATFX SPIR J&J -B: Performed by: ORTHOPAEDIC SURGERY

## 2022-11-19 PROCEDURE — 77030040361 HC SLV COMPR DVT MDII -B

## 2022-11-19 PROCEDURE — 77030003666 HC NDL SPINAL BD -A: Performed by: ANESTHESIOLOGY

## 2022-11-19 PROCEDURE — 77030042511 HC BIT DRL -E: Performed by: ORTHOPAEDIC SURGERY

## 2022-11-19 PROCEDURE — 77030031139 HC SUT VCRL2 J&J -A: Performed by: ORTHOPAEDIC SURGERY

## 2022-11-19 PROCEDURE — 74011250636 HC RX REV CODE- 250/636: Performed by: INTERNAL MEDICINE

## 2022-11-19 PROCEDURE — 76060000033 HC ANESTHESIA 1 TO 1.5 HR: Performed by: ORTHOPAEDIC SURGERY

## 2022-11-19 PROCEDURE — 93005 ELECTROCARDIOGRAM TRACING: CPT

## 2022-11-19 DEVICE — BOLT BONE L85MM DIA10MM GLD TI ALLOY FOR FEM NK SYS: Type: IMPLANTABLE DEVICE | Site: HIP | Status: FUNCTIONAL

## 2022-11-19 DEVICE — SCREW BONE L50MM DIA5MM TI ALLOY LCK ST W/ T25 STARDRV: Type: IMPLANTABLE DEVICE | Site: HIP | Status: FUNCTIONAL

## 2022-11-19 DEVICE — PLATE BONE 1 H TI ALLOY FOR FEM NK SYS: Type: IMPLANTABLE DEVICE | Site: HIP | Status: FUNCTIONAL

## 2022-11-19 DEVICE — ANTIROTATION SCREW FOR FEMORAL NECK SYS 85MM LENGTH - STERIL: Type: IMPLANTABLE DEVICE | Site: HIP | Status: FUNCTIONAL

## 2022-11-19 RX ORDER — PROPOFOL 10 MG/ML
INJECTION, EMULSION INTRAVENOUS AS NEEDED
Status: DISCONTINUED | OUTPATIENT
Start: 2022-11-19 | End: 2022-11-19 | Stop reason: HOSPADM

## 2022-11-19 RX ORDER — DIPHENHYDRAMINE HYDROCHLORIDE 50 MG/ML
12.5 INJECTION, SOLUTION INTRAMUSCULAR; INTRAVENOUS AS NEEDED
Status: DISCONTINUED | OUTPATIENT
Start: 2022-11-19 | End: 2022-11-19 | Stop reason: HOSPADM

## 2022-11-19 RX ORDER — SODIUM CHLORIDE 0.9 % (FLUSH) 0.9 %
5-40 SYRINGE (ML) INJECTION EVERY 8 HOURS
Status: CANCELLED | OUTPATIENT
Start: 2022-11-19

## 2022-11-19 RX ORDER — NALOXONE HYDROCHLORIDE 0.4 MG/ML
0.04 INJECTION, SOLUTION INTRAMUSCULAR; INTRAVENOUS; SUBCUTANEOUS
Status: CANCELLED | OUTPATIENT
Start: 2022-11-19

## 2022-11-19 RX ORDER — LISINOPRIL 20 MG/1
20 TABLET ORAL DAILY
Status: DISCONTINUED | OUTPATIENT
Start: 2022-11-20 | End: 2022-11-22 | Stop reason: HOSPADM

## 2022-11-19 RX ORDER — FLUMAZENIL 0.1 MG/ML
0.2 INJECTION INTRAVENOUS
Status: CANCELLED | OUTPATIENT
Start: 2022-11-19

## 2022-11-19 RX ORDER — ALBUTEROL SULFATE 0.83 MG/ML
2.5 SOLUTION RESPIRATORY (INHALATION) AS NEEDED
Status: DISCONTINUED | OUTPATIENT
Start: 2022-11-19 | End: 2022-11-19 | Stop reason: HOSPADM

## 2022-11-19 RX ORDER — LIDOCAINE HYDROCHLORIDE 10 MG/ML
INJECTION, SOLUTION EPIDURAL; INFILTRATION; INTRACAUDAL; PERINEURAL
Status: SHIPPED | OUTPATIENT
Start: 2022-11-19 | End: 2022-11-19

## 2022-11-19 RX ORDER — SODIUM CHLORIDE 0.9 % (FLUSH) 0.9 %
5-40 SYRINGE (ML) INJECTION AS NEEDED
Status: CANCELLED | OUTPATIENT
Start: 2022-11-19

## 2022-11-19 RX ORDER — SODIUM CHLORIDE, SODIUM LACTATE, POTASSIUM CHLORIDE, CALCIUM CHLORIDE 600; 310; 30; 20 MG/100ML; MG/100ML; MG/100ML; MG/100ML
125 INJECTION, SOLUTION INTRAVENOUS CONTINUOUS
Status: CANCELLED | OUTPATIENT
Start: 2022-11-19 | End: 2022-11-20

## 2022-11-19 RX ORDER — SODIUM CHLORIDE 0.9 % (FLUSH) 0.9 %
5-40 SYRINGE (ML) INJECTION AS NEEDED
Status: DISCONTINUED | OUTPATIENT
Start: 2022-11-19 | End: 2022-11-19 | Stop reason: HOSPADM

## 2022-11-19 RX ORDER — LIDOCAINE HYDROCHLORIDE 10 MG/ML
0.1 INJECTION, SOLUTION EPIDURAL; INFILTRATION; INTRACAUDAL; PERINEURAL AS NEEDED
Status: CANCELLED | OUTPATIENT
Start: 2022-11-19

## 2022-11-19 RX ORDER — ONDANSETRON 2 MG/ML
4 INJECTION INTRAMUSCULAR; INTRAVENOUS AS NEEDED
Status: DISCONTINUED | OUTPATIENT
Start: 2022-11-19 | End: 2022-11-19 | Stop reason: HOSPADM

## 2022-11-19 RX ORDER — FENTANYL CITRATE 50 UG/ML
25 INJECTION, SOLUTION INTRAMUSCULAR; INTRAVENOUS
Status: DISCONTINUED | OUTPATIENT
Start: 2022-11-19 | End: 2022-11-19 | Stop reason: HOSPADM

## 2022-11-19 RX ORDER — CEFAZOLIN SODIUM 1 G/3ML
INJECTION, POWDER, FOR SOLUTION INTRAMUSCULAR; INTRAVENOUS AS NEEDED
Status: DISCONTINUED | OUTPATIENT
Start: 2022-11-19 | End: 2022-11-19 | Stop reason: HOSPADM

## 2022-11-19 RX ORDER — SODIUM CHLORIDE, SODIUM LACTATE, POTASSIUM CHLORIDE, CALCIUM CHLORIDE 600; 310; 30; 20 MG/100ML; MG/100ML; MG/100ML; MG/100ML
125 INJECTION, SOLUTION INTRAVENOUS CONTINUOUS
Status: DISCONTINUED | OUTPATIENT
Start: 2022-11-19 | End: 2022-11-19 | Stop reason: HOSPADM

## 2022-11-19 RX ORDER — SODIUM CHLORIDE 0.9 % (FLUSH) 0.9 %
5-40 SYRINGE (ML) INJECTION EVERY 8 HOURS
Status: DISCONTINUED | OUTPATIENT
Start: 2022-11-19 | End: 2022-11-19 | Stop reason: HOSPADM

## 2022-11-19 RX ORDER — FENTANYL CITRATE 50 UG/ML
INJECTION, SOLUTION INTRAMUSCULAR; INTRAVENOUS AS NEEDED
Status: DISCONTINUED | OUTPATIENT
Start: 2022-11-19 | End: 2022-11-19 | Stop reason: HOSPADM

## 2022-11-19 RX ORDER — BUPIVACAINE HYDROCHLORIDE 5 MG/ML
INJECTION, SOLUTION EPIDURAL; INTRACAUDAL
Status: SHIPPED | OUTPATIENT
Start: 2022-11-19 | End: 2022-11-19

## 2022-11-19 RX ORDER — LIDOCAINE HYDROCHLORIDE 20 MG/ML
INJECTION, SOLUTION INFILTRATION; PERINEURAL AS NEEDED
Status: DISCONTINUED | OUTPATIENT
Start: 2022-11-19 | End: 2022-11-19 | Stop reason: HOSPADM

## 2022-11-19 RX ORDER — FENTANYL CITRATE 50 UG/ML
INJECTION, SOLUTION INTRAMUSCULAR; INTRAVENOUS
Status: COMPLETED
Start: 2022-11-19 | End: 2022-11-19

## 2022-11-19 RX ORDER — HYDROMORPHONE HYDROCHLORIDE 1 MG/ML
.25-1 INJECTION, SOLUTION INTRAMUSCULAR; INTRAVENOUS; SUBCUTANEOUS
Status: DISCONTINUED | OUTPATIENT
Start: 2022-11-19 | End: 2022-11-19 | Stop reason: HOSPADM

## 2022-11-19 RX ADMIN — PROPOFOL 20 MG: 10 INJECTION, EMULSION INTRAVENOUS at 19:07

## 2022-11-19 RX ADMIN — PROPOFOL 80 MCG/KG/MIN: 10 INJECTION, EMULSION INTRAVENOUS at 19:08

## 2022-11-19 RX ADMIN — BUPIVACAINE HYDROCHLORIDE 3 MG: 5 INJECTION, SOLUTION EPIDURAL; INTRACAUDAL; PERINEURAL at 18:54

## 2022-11-19 RX ADMIN — LIDOCAINE HYDROCHLORIDE 3 MG: 10 INJECTION, SOLUTION EPIDURAL; INFILTRATION; INTRACAUDAL; PERINEURAL at 18:50

## 2022-11-19 RX ADMIN — FENTANYL CITRATE 100 MCG: 50 INJECTION, SOLUTION INTRAMUSCULAR; INTRAVENOUS at 18:50

## 2022-11-19 RX ADMIN — LIDOCAINE HYDROCHLORIDE 100 MG: 20 INJECTION, SOLUTION INFILTRATION; PERINEURAL at 19:07

## 2022-11-19 RX ADMIN — MORPHINE SULFATE 4 MG: 4 INJECTION INTRAVENOUS at 08:44

## 2022-11-19 RX ADMIN — MORPHINE SULFATE 4 MG: 4 INJECTION INTRAVENOUS at 11:56

## 2022-11-19 RX ADMIN — PROPOFOL 20 MG: 10 INJECTION, EMULSION INTRAVENOUS at 19:09

## 2022-11-19 RX ADMIN — OXYCODONE AND ACETAMINOPHEN 1 TABLET: 5; 325 TABLET ORAL at 05:31

## 2022-11-19 RX ADMIN — PROPOFOL 20 MG: 10 INJECTION, EMULSION INTRAVENOUS at 19:12

## 2022-11-19 RX ADMIN — SODIUM CHLORIDE, PRESERVATIVE FREE 10 ML: 5 INJECTION INTRAVENOUS at 15:16

## 2022-11-19 RX ADMIN — MORPHINE SULFATE 4 MG: 4 INJECTION INTRAVENOUS at 15:16

## 2022-11-19 RX ADMIN — CEFAZOLIN SODIUM 2 G: 1 POWDER, FOR SOLUTION INTRAMUSCULAR; INTRAVENOUS at 19:10

## 2022-11-19 RX ADMIN — SODIUM CHLORIDE, PRESERVATIVE FREE 10 ML: 5 INJECTION INTRAVENOUS at 06:21

## 2022-11-19 NOTE — PROGRESS NOTES
Orthopedics is aware of the patient. Dr. Myrna Soto to see patient tonight. Plan for left total hip arthroplasty versus CRPP versus femoral neck ORIF system. Dr. Myrna Soto to review tonight. Preoperative placed. NPO after midnight. Consult note per Dr. Myrna Soto. Appreciate hospitalist admission and medical management.     FIDEL BalderasC  [facility]  [address] [place] [address]

## 2022-11-19 NOTE — ED NOTES
TRANSFER - OUT REPORT:    Verbal report given to Steph RN(name) on Salma Smith  being transferred to [facility] (unit) for routine progression of care       Report consisted of patients Situation, Background, Assessment and   Recommendations(SBAR). Information from the following report(s) SBAR was reviewed with the receiving nurse. Lines:   Peripheral IV 11/18/22 Right Forearm (Active)   Site Assessment Clean, dry, & intact 11/18/22 1821   Phlebitis Assessment 0 11/18/22 1821   Infiltration Assessment 0 11/18/22 1821   Dressing Status Clean, dry, & intact 11/18/22 1821   Hub Color/Line Status Pink;Patent; Flushed 11/18/22 1821        Opportunity for questions and clarification was provided.       Patient transported with:   transport

## 2022-11-19 NOTE — PROGRESS NOTES
[address]  [phone]         Hospitalist Progress Note        NAME:  John Simpson   :  1945   MRN:  [id number]    Date/Time:  2022     Patient PCP:  Warren Hamilton DO    Emergency Contact:    Extended Emergency Contact Information  Primary Emergency Contact: Elaine Hunter  Address: [address] [place] [address] Phone: [phone]  Relation: Spouse      Code: Full Code     Isolation Precautions: There are currently no Active Isolations        Subjective:     REASON FOR VISIT:  Recheck femur fracture    HPI & INTERVAL HISTORY:     Mr. Fany Dye is a 78 y.o. male with history of HTN presents after falling from his horse. He has been in his normal state of health without any chest pain, sob, cough, fever, chills, etc. Simply got caught in the stirrup and went down on his left hip. He is unable to lay flat to tolerate CT.     : The patient was seen and examined. Reports that he is having constant moderate left hip pain which becomes severe with movement due to the broken hip. Pain medications are helping. Scheduled for surgery today.       ALLERGIES  No Known Allergies      ROS:  Gen:   Negative  Eyes:  negative  ENT:    negative  Resp:  negative  CVS:   negative  GI:       negative  :     negative  MS:     negative and left hip pain         Objective:      Visit Vitals  /77 (BP 1 Location: Left upper arm, BP Patient Position: At rest)   Pulse 80   Temp 97.8 °F (36.6 °C)   Resp 18   Ht 6' (1.829 m)   Wt 88.5 kg (195 lb)   SpO2 91%   BMI 26.45 kg/m²       Physical Exam:  General: alert, well appearing, and no distress  Head: Normocephalic, without obvious abnormality, atraumatic  Eyes: PERRL, EOMI, anicteric sclerae, and conjuntiva clear  ENT: lips, mucosa, and tongue normal  Neck: normal, supple, and no tenderness  Lungs: clear to auscultation with good breath sounds and normal respiratory effort  Heart: S1, S2 normal, regular rate, and regular rhythm  Abd: not distended, soft, nontender, BS present and normactive  Ext: no cyanosis and no edema  Skin: normal skin color, no rashes, and texture normal  Neuro:  alert, oriented x 3, no defects noted in general exam.  Psych: not anxious, cooperative, appropriate affect       Medications:  Current Facility-Administered Medications   Medication Dose Route Frequency    sodium chloride (NS) flush 5-40 mL  5-40 mL IntraVENous Q8H    sodium chloride (NS) flush 5-40 mL  5-40 mL IntraVENous PRN    acetaminophen (TYLENOL) tablet 650 mg  650 mg Oral Q6H PRN    Or    acetaminophen (TYLENOL) suppository 650 mg  650 mg Rectal Q6H PRN    polyethylene glycol (MIRALAX) packet 17 g  17 g Oral DAILY PRN    ondansetron (ZOFRAN ODT) tablet 4 mg  4 mg Oral Q8H PRN    Or    ondansetron (ZOFRAN) injection 4 mg  4 mg IntraVENous Q6H PRN    enoxaparin (LOVENOX) injection 40 mg  40 mg SubCUTAneous DAILY    morphine injection 4 mg  4 mg IntraVENous Q3H PRN    oxyCODONE-acetaminophen (PERCOCET) 5-325 mg per tablet 1 Tablet  1 Tablet Oral Q4H PRN        Labs:  Recent Labs     11/18/22  1812   WBC 17.0*   HGB 14.5   HCT 44.1        Recent Labs     11/18/22  1812      K 3.9      CO2 26   *   BUN 21*   CREA 1.20   CA 9.8   MG 2.2   ALB 4.1   TBILI 0.8   ALT 28         Radiology:  XR HIP LT W OR WO PELV 2-3 VWS    Result Date: 11/18/2022  Impaction fracture left femoral neck. Degenerative changes left hip and lumbosacral spine. XR CHEST PORT    Result Date: 11/18/2022  No evidence of acute cardiopulmonary process.      I personally reviewed and interpreted the imaging studies and agree with official reading    The labs, imaging studies, medications, and consultants notes was reviewed by me on: November 19, 2022         Assessment/Plan:      Left femoral neck impaction fracture as result of falling from horse:   No other trauma  Continue pain control with Tylenol for mild, Roxicodone for moderate, and IV morphine for severe pain  Ortho planning on surgery today     HTN:   On lisinopril    Body mass index is 26.45 kg/m².:  25.0 - 29.9:  Overweight    F: None  E: Monitor  N: DIET NPO       Risk of deterioration: high      Discussed:  Pt's condition, Imaging findings, Lab findings, Assessment, and Care Plan discussed with: Patient and RN    Prophylaxis:  Lovenox SQ    Anticipated discharge disposition:  SNF    HR DC Barriers: Currently not medically stable for discharge and Needs treatment/procedures hip surgery      Total time: -35- minutes **I personally saw and examined the patient during this time period**      Date of service:    11/19/2022                ___________________________________________________    Admitting Physician: Andreas Bailey MD

## 2022-11-19 NOTE — ANESTHESIA PREPROCEDURE EVALUATION
Relevant Problems   CARDIOVASCULAR   (+) HTN (hypertension)      RENAL FAILURE   (+) LILI (acute kidney injury) ([facility] [address]   (+) Renal cell carcinoma (HCC)      PERSONAL HX & FAMILY HX OF CANCER   (+) Renal cell carcinoma (HCC)       Anesthetic History   No history of anesthetic complications            Review of Systems / Medical History  Patient summary reviewed and pertinent labs reviewed    Pulmonary        Sleep apnea: No treatment        Comments: Uses mouth device for GIOVANNI   Neuro/Psych              Cardiovascular    Hypertension          Hyperlipidemia    Exercise tolerance: >4 METS     GI/Hepatic/Renal     GERD           Endo/Other        Arthritis     Other Findings   Comments: Hip fracture           Physical Exam    Airway  Mallampati: II  TM Distance: 4 - 6 cm  Neck ROM: normal range of motion   Mouth opening: Normal     Cardiovascular    Rhythm: regular          Comments: Slight tachycardia Dental      Comments: +multiple caps/dental fillings   Pulmonary  Breath sounds clear to auscultation               Abdominal         Other Findings            Anesthetic Plan    ASA: 3  Anesthesia type: spinal and general - backup          Induction: Intravenous  Anesthetic plan and risks discussed with: Patient

## 2022-11-19 NOTE — PROGRESS NOTES
Problem: Falls - Risk of  Goal: *Absence of Falls  Description: Document Elvira Abdon Fall Risk and appropriate interventions in the flowsheet.   Outcome: Progressing Towards Goal  Note: Fall Risk Interventions:            Medication Interventions: Bed/chair exit alarm, Patient to call before getting OOB, Evaluate medications/consider consulting pharmacy, Teach patient to arise slowly                   Problem: Pain  Goal: *Control of Pain  Outcome: Progressing Towards Goal

## 2022-11-19 NOTE — CONSULTS
SUBJECTIVE:     Shaq Whitfield is a 78 y.o. male  evaluated for a Left hip femoral neck fx. Per the patient, he was trying to get off his horse when he foot got stuck in the stirups. He fell landing on his left hip. He felt immediate pain and inability to ambulate. He was taken to [facility] where xrays confirmed a non displaced femoral neck fracture. Of note, the patient is known to the [facility] practice, he had a R ELIGIO by Dr. Reynadlo Simmons in 2016. He denies numbness or tingling in the LLE. Past Medical History :   Past Medical History:   Diagnosis Date    Arthritis     marc shoulders and right hip    Cancer ([facility] [place] [address]     kidney, (surgery to remove 1/4 of right kidney.)    GERD (gastroesophageal reflux disease)     Hemorrhoids     High cholesterol     Hypertension     Ill-defined condition     cataracts--no surgery yet    Other ill-defined conditions(799.89) 8/2012    right renal mass newly diagnosed    Other ill-defined conditions(799.89)     heart murmur    Prostate infection     resolved    Psychiatric disorder     panic attacks, last one 9/2013; anxiety    Unspecified sleep apnea 2006    surgery- excision of uvula; 2013 using dental mouthpiece       Past Surgical History :   Past Surgical History:   Procedure Laterality Date    COLONOSCOPY N/A 8/9/2021    COLONOSCOPY performed by Ab Brown MD at [address]      as a child    HX CHOLECYSTECTOMY  10/30/2013    lap choley    HX HEENT  2006    excision of uvula    HX HERNIA REPAIR      rih repair age 8 and again 5 years ago, LIH 32 years ago    [facility] [address]    right knee athroscopy    HX ORTHOPAEDIC Right 2016    hip replacement    HX SHOULDER REPLACEMENT Left 2014    HX SHOULDER REPLACEMENT Right 2015    HX TONSILLECTOMY      HX UROLOGICAL  2012    right partial nephrectomy    [place] EXCISION OF UVULA          Medications :  See med reconciliation    Allergies :   Patient has no known allergies.      Social History :  Social History Socioeconomic History    Marital status:      Spouse name: Not on file    Number of children: Not on file    Years of education: Not on file    Highest education level: Not on file   Occupational History    Not on file   Tobacco Use    Smoking status: Never    Smokeless tobacco: Never   Substance and Sexual Activity    Alcohol use: Yes     Alcohol/week: 0.0 standard drinks     Comment: rarely, nothing past 6 months    Drug use: No    Sexual activity: Not on file   Other Topics Concern    Not on file   Social History Narrative    Not on file     Social Determinants of Health     Financial Resource Strain: Not on file   Food Insecurity: Not on file   Transportation Needs: Not on file   Physical Activity: Not on file   Stress: Not on file   Social Connections: Not on file   Intimate Partner Violence: Not on file   Housing Stability: Not on file       Review of Systems:  (bold if positive, if negative)    Gen:  Eyes:  ENT:  CVS:  Pulm:  GI:  :  MS:  Skin:  Psych:  Endo:  Hem:  Renal:  Neuro:         FAMILY HISTORY :  Family History   Problem Relation Age of Onset    Hypertension Mother     Cancer Father     Heart Disease Father     Hypertension Father     Stroke Father        SOCIAL :  Occupation - Retired      Objective:     Vitals :  Patient Vitals for the past 12 hrs:   BP Temp Pulse Resp SpO2 Height Weight   11/18/22 1948 (!) 149/89 98.4 °F (36.9 °C) 92 18 94 % -- --   11/18/22 1448 (!) 157/94 98 °F (36.7 °C) (!) 104 18 95 % 6' (1.829 m) 88.5 kg (195 lb)       Eyes with conjugate movement, PEARLA  Alert and Oriented x 3.    No Acute Respiratory Distress  Heart and Lung exam normal    Focused Physical Exam :   LLE:  Skin intact  +TTP over the hip/groin  No Lymphadenopathy   Palpable pulses  No skin trophic changes  Deferred motion exam   5/5 motor  + Logroll    Labs :   Recent Labs     11/18/22  1812   HGB 14.5   HCT 44.1      K 3.9      CO2 26   BUN 21*   CREA 1.20   *       X-rays : Hip/pelvis show a non displaced completed left femoral neck fracture (subcapital)    CT: pending     ASSESSMENT :  79 yo M with a non displaced femoral neck fracture s/p fall      1. Therapy / Weight Bearing Recommendations following surgery : WBAT  2. DVT Prophylaxis (Following surgery) : Mechanical and asa 325 BID  3. Anticipated Disposition : Home  4. Surgery Recommendations : CRPP of L hip. If the fracture displaced between now and the surgery, we will do a DA total hip replacement. Thank you for allowing to participate in this patients care. Please do not hesitate to contact my office or contact me with any questions or concerns. I will continue to follow the patient in the hospital and arrange for follow-up after the surgery.           Joyce Barr MD

## 2022-11-19 NOTE — H&P
Hospitalist Admission Note    NAME: Frankie Rodrigues   :  1945   MRN:  [id number]     Date/Time:  2022 7:06 PM    Patient PCP: Ana Miller DO  ________________________________________________________________________    Given the patient's current clinical presentation, I have a high level of concern for decompensation if discharged from the emergency department. Complex decision making was performed, which includes reviewing the patient's available past medical records, laboratory results, and x-ray films. My assessment of this patient's clinical condition and my plan of care is as follows. Assessment / Plan:    #L femoral neck impaction fracture: Sustained after falling from horse. No other trauma induced. No prior cardiac hx and has no recent symptoms to suggest need for pre-op testing. No problems with anesthesia or blood clots. - Ortho to see   - For now, moprhine 4mg, oxy 5, APAP; awaiting labs, can use ketorolac if normal renal function   - LMWh ppx   - NPO at midnight    #HTN: Only takes lisinopril. High now in s/o pain. Otherwise, ok to resume          I have personally reviewed the radiographs, laboratory data in Epic and decisions and statements above are based partially on this personal interpretation. Code Status: Full Code  DVT Prophylaxis: Lovenox  GI Prophylaxis: not indicated       Subjective:   CHIEF COMPLAINT: \"fall\"    HISTORY OF PRESENT ILLNESS:     Chio Boyce is a 78 y.o.  hx HTN presents after falling from his horse. He has been in his normal state of health without any chest pain, sob, cough, fever, chills, etc. Simply got caught in the stirrup and went down on his left hip. He is unable to lay flat to tolerate CT.          Past Medical History:   Diagnosis Date    Arthritis     marc shoulders and right hip    Cancer ([facility] [address]     kidney, (surgery to remove 1/4 of right kidney.)    GERD (gastroesophageal reflux disease)     Hemorrhoids     High cholesterol Hypertension     Ill-defined condition     cataracts--no surgery yet    Other ill-defined conditions(799.89) 8/2012    right renal mass newly diagnosed    Other ill-defined conditions(799.89)     heart murmur    Prostate infection     resolved    Psychiatric disorder     panic attacks, last one 9/2013; anxiety    Unspecified sleep apnea 2006    surgery- excision of uvula; 2013 using dental mouthpiece      Past Surgical History:   Procedure Laterality Date    COLONOSCOPY N/A 8/9/2021    COLONOSCOPY performed by Hanna Harman MD at [address]      as a child    HX CHOLECYSTECTOMY  10/30/2013    lap choley    HX HEENT  2006    excision of uvula    HX HERNIA REPAIR      rih repair age 8 and again 5 years ago, LIH 32 years ago    [facility]    right knee athroscopy    HX ORTHOPAEDIC Right 2016    hip replacement    HX SHOULDER REPLACEMENT Left 2014    HX SHOULDER REPLACEMENT Right 2015    HX TONSILLECTOMY      HX UROLOGICAL  2012    right partial nephrectomy    [place] EXCISION OF UVULA       Social History     Tobacco Use    Smoking status: Never    Smokeless tobacco: Never   Substance Use Topics    Alcohol use: Yes     Alcohol/week: 0.0 standard drinks     Comment: rarely, nothing past 6 months      Family History   Problem Relation Age of Onset    Hypertension Mother     Cancer Father     Heart Disease Father     Hypertension Father     Stroke Father         No Known Allergies     Prior to Admission medications    Medication Sig Start Date End Date Taking? Authorizing Provider   multivitamin (ONE A DAY) tablet Take 1 Tablet by mouth daily. Provider, Historical   ferrous sulfate 325 mg (65 mg iron) tablet Take 65 mg by mouth daily. Provider, Historical   folic acid 752 mcg tablet Take 400 mg by mouth daily. Provider, Historical   pantoprazole (Protonix) 20 mg tablet Take 40 mg by mouth daily. Provider, Historical   magnesium 250 mg tab Take 250 mg by mouth daily.     Provider, Historical   traMADol (ULTRAM) 50 mg tablet Take 50 mg by mouth every six (6) hours as needed for Pain. Provider, Historical   lisinopril (PRINIVIL, ZESTRIL) 20 mg tablet Take 20 mg by mouth daily. Provider, Historical   doxycycline (ADOXA) 100 mg tablet Take 100 mg by mouth daily. Provider, Historical   DULOXETINE HCL, BULK, Take 90 mg by mouth every evening.     Provider, Historical     REVIEW OF SYSTEMS:  See HPI for details  General: negative for fever, chills, sweats, weakness, weight loss  Eyes: negative for blurred vision, eye pain, loss of vision, diplopia  Ear Nose and Throat: negative for rhinorrhea, pharyngitis, otalgia, tinnitus, speech or swallowing difficulties  Respiratory:  negative for pleuritic pain, cough, sputum production, wheezing, SOB, IVY  Cardiology:  negative for chest pain, palpitations, orthopnea, PND, edema, syncope   Gastrointestinal: negative for abdominal pain, N/V, dysphagia, change in bowel habits, bleeding  Genitourinary: negative for frequency, urgency, dysuria, hematuria, incontinence  Muskuloskeletal : ++ for arthralgia, myalgia  Hematology: negative for easy bruising, bleeding, lymphadenopathy  Dermatological: negative for rash, ulceration, mole change, new lesion  Endocrine: negative for hot flashes or polydipsia  Neurological: negative for headache, dizziness, confusion, focal weakness, paresthesia, memory loss, gait disturbance  Psychological: negative for anxiety, depression, agitation      Objective:   VITALS:    Visit Vitals  BP (!) 157/94 (BP 1 Location: Right upper arm, BP Patient Position: At rest;Sitting)   Pulse (!) 104   Temp 98 °F (36.7 °C)   Resp 18   Ht 6' (1.829 m)   Wt 88.5 kg (195 lb)   SpO2 95%   BMI 26.45 kg/m²     PHYSICAL EXAM:    Physical Exam:    Gen: Well-developed, well-nourished, in no acute distress  HEENT:  Pink conjunctivae, PERRL, hearing intact to voice, moist mucous membranes  Neck: Supple, without masses, thyroid non-tender  Resp: No accessory muscle use, clear breath sounds without wheezes rales or rhonchi  Card: No murmurs, normal S1, S2 without thrills, bruits or peripheral edema  Abd:  Soft, non-tender, non-distended, normoactive bowel sounds are present, no palpable organomegaly and no detectable hernias  Lymph:  No cervical or inguinal adenopathy  Musc: LLE shorter than right; unable to ROM  Skin: No rashes or ulcers, skin turgor is good  Neuro:  Cranial nerves are grossly intact, no focal motor weakness, follows commands appropriately  Psych:  Good insight, oriented to person, place and time, alert          _______________________________________________________________________  Care Plan discussed with:    Comments   Patient x Discussed with patient in room. POC outlined and Questions answered    Family  x    RN x    Care Manager                    Consultant:  huy DAMON MD   _______________________________________________________________________  Recommended Disposition:   Home with Family x   HH/PT/OT/RN    SNF/LTC    APPLE    ________________________________________________________________________  TOTAL TIME:  45 Minutes        Comments   >50% of visit spent in counseling and coordination of care  Chart reviewed  Discussion with patient and/or family and questions answered     ________________________________________________________________________  Signed: Barb Leon MD        Procedures: see electronic medical records for all procedures/Xrays and details which were not copied into this note but were reviewed prior to creation of Plan. LAB DATA REVIEWED:    Recent Results (from the past 24 hour(s))   SAMPLES BEING HELD    Collection Time: 11/18/22  6:12 PM   Result Value Ref Range    SAMPLES BEING HELD  1RED, 1SST, 1DK GRN, 1BLU     COMMENT        Add-on orders for these samples will be processed based on acceptable specimen integrity and analyte stability, which may vary by analyte.

## 2022-11-20 LAB
ANION GAP SERPL CALC-SCNC: 9 MMOL/L (ref 5–15)
BASOPHILS # BLD: 0.1 K/UL (ref 0–0.1)
BASOPHILS NFR BLD: 1 % (ref 0–1)
BUN SERPL-MCNC: 21 MG/DL (ref 6–20)
BUN/CREAT SERPL: 17 (ref 12–20)
CALCIUM SERPL-MCNC: 9.2 MG/DL (ref 8.5–10.1)
CHLORIDE SERPL-SCNC: 106 MMOL/L (ref 97–108)
CO2 SERPL-SCNC: 24 MMOL/L (ref 21–32)
CREAT SERPL-MCNC: 1.22 MG/DL (ref 0.7–1.3)
DIFFERENTIAL METHOD BLD: ABNORMAL
EOSINOPHIL # BLD: 0.2 K/UL (ref 0–0.4)
EOSINOPHIL NFR BLD: 1 % (ref 0–7)
ERYTHROCYTE [DISTWIDTH] IN BLOOD BY AUTOMATED COUNT: 13 % (ref 11.5–14.5)
GLUCOSE SERPL-MCNC: 81 MG/DL (ref 65–100)
HCT VFR BLD AUTO: 42.7 % (ref 36.6–50.3)
HGB BLD-MCNC: 13.7 G/DL (ref 12.1–17)
IMM GRANULOCYTES # BLD AUTO: 0.1 K/UL (ref 0–0.04)
IMM GRANULOCYTES NFR BLD AUTO: 1 % (ref 0–0.5)
LYMPHOCYTES # BLD: 0.6 K/UL (ref 0.8–3.5)
LYMPHOCYTES NFR BLD: 5 % (ref 12–49)
MCH RBC QN AUTO: 28.7 PG (ref 26–34)
MCHC RBC AUTO-ENTMCNC: 32.1 G/DL (ref 30–36.5)
MCV RBC AUTO: 89.5 FL (ref 80–99)
MONOCYTES # BLD: 1 K/UL (ref 0–1)
MONOCYTES NFR BLD: 7 % (ref 5–13)
NEUTS SEG # BLD: 11.3 K/UL (ref 1.8–8)
NEUTS SEG NFR BLD: 86 % (ref 32–75)
NRBC # BLD: 0 K/UL (ref 0–0.01)
NRBC BLD-RTO: 0 PER 100 WBC
PLATELET # BLD AUTO: 250 K/UL (ref 150–400)
PMV BLD AUTO: 10.2 FL (ref 8.9–12.9)
POTASSIUM SERPL-SCNC: 4.5 MMOL/L (ref 3.5–5.1)
RBC # BLD AUTO: 4.77 M/UL (ref 4.1–5.7)
SODIUM SERPL-SCNC: 139 MMOL/L (ref 136–145)
WBC # BLD AUTO: 13.2 K/UL (ref 4.1–11.1)

## 2022-11-20 PROCEDURE — 80048 BASIC METABOLIC PNL TOTAL CA: CPT

## 2022-11-20 PROCEDURE — 74011250637 HC RX REV CODE- 250/637: Performed by: PHYSICIAN ASSISTANT

## 2022-11-20 PROCEDURE — 97530 THERAPEUTIC ACTIVITIES: CPT

## 2022-11-20 PROCEDURE — 36415 COLL VENOUS BLD VENIPUNCTURE: CPT

## 2022-11-20 PROCEDURE — 77010033678 HC OXYGEN DAILY

## 2022-11-20 PROCEDURE — 74011250636 HC RX REV CODE- 250/636: Performed by: INTERNAL MEDICINE

## 2022-11-20 PROCEDURE — 74011250636 HC RX REV CODE- 250/636: Performed by: PHYSICIAN ASSISTANT

## 2022-11-20 PROCEDURE — 65270000029 HC RM PRIVATE

## 2022-11-20 PROCEDURE — 74011000250 HC RX REV CODE- 250: Performed by: PHYSICIAN ASSISTANT

## 2022-11-20 PROCEDURE — 74011250637 HC RX REV CODE- 250/637: Performed by: HOSPITALIST

## 2022-11-20 PROCEDURE — 85025 COMPLETE CBC W/AUTO DIFF WBC: CPT

## 2022-11-20 PROCEDURE — 2709999900 HC NON-CHARGEABLE SUPPLY

## 2022-11-20 PROCEDURE — 74011000250 HC RX REV CODE- 250: Performed by: INTERNAL MEDICINE

## 2022-11-20 PROCEDURE — 74011250637 HC RX REV CODE- 250/637: Performed by: INTERNAL MEDICINE

## 2022-11-20 PROCEDURE — G0378 HOSPITAL OBSERVATION PER HR: HCPCS

## 2022-11-20 PROCEDURE — 97110 THERAPEUTIC EXERCISES: CPT

## 2022-11-20 PROCEDURE — 77030036660

## 2022-11-20 PROCEDURE — 97162 PT EVAL MOD COMPLEX 30 MIN: CPT

## 2022-11-20 RX ORDER — CYCLOBENZAPRINE HCL 10 MG
10 TABLET ORAL
Status: DISCONTINUED | OUTPATIENT
Start: 2022-11-20 | End: 2022-11-22 | Stop reason: HOSPADM

## 2022-11-20 RX ADMIN — LISINOPRIL 20 MG: 20 TABLET ORAL at 08:45

## 2022-11-20 RX ADMIN — OXYCODONE AND ACETAMINOPHEN 1 TABLET: 5; 325 TABLET ORAL at 03:29

## 2022-11-20 RX ADMIN — OXYCODONE AND ACETAMINOPHEN 1 TABLET: 5; 325 TABLET ORAL at 12:58

## 2022-11-20 RX ADMIN — OXYCODONE AND ACETAMINOPHEN 1 TABLET: 5; 325 TABLET ORAL at 18:04

## 2022-11-20 RX ADMIN — CEFAZOLIN 2 G: 1 INJECTION, POWDER, FOR SOLUTION INTRAMUSCULAR; INTRAVENOUS at 12:58

## 2022-11-20 RX ADMIN — SODIUM CHLORIDE, PRESERVATIVE FREE 10 ML: 5 INJECTION INTRAVENOUS at 00:29

## 2022-11-20 RX ADMIN — SODIUM CHLORIDE, PRESERVATIVE FREE 10 ML: 5 INJECTION INTRAVENOUS at 06:18

## 2022-11-20 RX ADMIN — MORPHINE SULFATE 4 MG: 4 INJECTION INTRAVENOUS at 00:26

## 2022-11-20 RX ADMIN — ENOXAPARIN SODIUM 40 MG: 100 INJECTION SUBCUTANEOUS at 08:45

## 2022-11-20 RX ADMIN — CYCLOBENZAPRINE 10 MG: 10 TABLET, FILM COATED ORAL at 14:37

## 2022-11-20 RX ADMIN — SODIUM CHLORIDE, PRESERVATIVE FREE 10 ML: 5 INJECTION INTRAVENOUS at 21:52

## 2022-11-20 RX ADMIN — MORPHINE SULFATE 4 MG: 4 INJECTION INTRAVENOUS at 21:51

## 2022-11-20 RX ADMIN — MORPHINE SULFATE 4 MG: 4 INJECTION INTRAVENOUS at 13:51

## 2022-11-20 RX ADMIN — CEFAZOLIN 2 G: 1 INJECTION, POWDER, FOR SOLUTION INTRAMUSCULAR; INTRAVENOUS at 06:18

## 2022-11-20 NOTE — PROGRESS NOTES
Problem: Mobility Impaired (Adult and Pediatric)  Goal: *Acute Goals and Plan of Care (Insert Text)  Description: FUNCTIONAL STATUS PRIOR TO ADMISSION: Patient was independent and active without use of DME.    HOME SUPPORT PRIOR TO ADMISSION: The patient lived with spouse and did not require assistance. Physical Therapy Goals  Initiated 11/20/2022  1. Patient will move from supine to sit and sit to supine  in bed with supervision/set-up within 7 day(s). 2.  Patient will transfer from bed to chair and chair to bed with supervision/set-up using the least restrictive device within 7 day(s). 3.  Patient will perform sit to stand with supervision/set-up within 7 day(s). 4.  Patient will ambulate with supervision/set-up for 100 feet with the least restrictive device within 7 day(s). 5.  Patient will ascend/descend 4 stairs with one handrail(s) with minimal assistance/contact guard assist within 7 day(s). Outcome: Not Met   PHYSICAL THERAPY EVALUATION  Patient: Shaq Whitfield (18 y.o. male)  Date: 11/20/2022  Primary Diagnosis: Hip fracture (HCC) [S72.009A]  Procedure(s) (LRB):  HIP PERCUTANEOUS PINNING VS FEMUR OPEN REDUCTION INTERNAL FIXATION (Left) 1 Day Post-Op   Precautions:   WBAT, Fall    ASSESSMENT  Based on the objective data described below, the patient presents with severe L thigh pain, L thigh spasms, high fear of mobility and pain, decreased strength and AROM at LLE associated with pain, and limited functional mobility on POD 1 s/p L hip percutaneous pinning fixation to stabilize impaction fracture of the left femoral neck sustained when he fell while horseback riding. Pt demonstrates very high guard for any AROM and PROM of either LE and sometimes at UE, stating he anticipates pain at L thigh or hip. He performs very gentle exercises below followed by passive repositioning of bed for flat knees.  Pt stating desire to sit edge of bed, as sitting was more comfortable prior to surgery, as well as assessment of his ability to mobilize to bedside commode. Pt with elevated BP that decreases somewhat with rest after treatment. RN aware. Anticipate significantly improved mobility with improved pain management. Pain rated 7/10 despite pre-medication by RN. If pt insists on bedside commode use at current functional level recommend he stands with assist x 2 and have bed removed while commode placed behind him. Stand pivot transfers are limited by severe pain at this time. RN aware of recommendation. Current Level of Function Impacting Discharge (mobility/balance): mod A x 2 bed mobility    Functional Outcome Measure: The patient scored 50 on the Barthel outcome measure which is indicative of partially dependent status. Other factors to consider for discharge: none additional     Patient will benefit from skilled therapy intervention to address the above noted impairments. PLAN :  Recommendations and Planned Interventions: bed mobility training, transfer training, gait training, therapeutic exercises, neuromuscular re-education, modalities, edema management/control, patient and family training/education, and therapeutic activities      Frequency/Duration: Patient will be followed by physical therapy:  twice daily to address goals. Recommendation for discharge: (in order for the patient to meet his/her long term goals)  To be determined: IPR vs HHPT pending progress    This discharge recommendation:  Has not yet been discussed the attending provider and/or case management    IF patient discharges home will need the following DME: to be determined (TBD)         SUBJECTIVE:   Patient stated I don't want to stand up but I want to see if I can get to the toilet.  Pt educated regarding mobility required for bedside commode transfer. Pt received supine, agreeable to PT and cleared by RN.     OBJECTIVE DATA SUMMARY:   HISTORY:    Past Medical History:   Diagnosis Date    Arthritis     marc shoulders and right hip    Cancer ([facility] [address]     kidney, (surgery to remove 1/4 of right kidney.)    GERD (gastroesophageal reflux disease)     Hemorrhoids     High cholesterol     Hypertension     Ill-defined condition     cataracts--no surgery yet    Other ill-defined conditions(799.89) 8/2012    right renal mass newly diagnosed    Other ill-defined conditions(799.89)     heart murmur    Prostate infection     resolved    Psychiatric disorder     panic attacks, last one 9/2013; anxiety    Unspecified sleep apnea 2006    surgery- excision of uvula; 2013 using dental mouthpiece     Past Surgical History:   Procedure Laterality Date    COLONOSCOPY N/A 8/9/2021    COLONOSCOPY performed by Gaither Blizzard, MD at [address]      as a child    HX CHOLECYSTECTOMY  10/30/2013    lap choley    HX HEENT  2006    excision of uvula    HX HERNIA REPAIR      rih repair age 8 and again 5 years ago, LIH 27 years ago    [facility]    right knee athroscopy    HX ORTHOPAEDIC Right 2016    hip replacement    HX SHOULDER REPLACEMENT Left 2014    HX SHOULDER REPLACEMENT Right 2015    HX TONSILLECTOMY      HX UROLOGICAL  2012    right partial nephrectomy    [place] EXCISION OF UVULA         Personal factors and/or comorbidities impacting plan of care: as above    Home Situation  Home Environment: Private residence  # Steps to Enter: 5  Rails to Enter: Yes  Hand Rails : Bilateral  One/Two Story Residence: Two story, live on 1st floor  # of Interior Steps: 14  Living Alone: No  Support Systems: Spouse/Significant Other  Patient Expects to be Discharged to[de-identified] Unable to determine at this time  Current DME Used/Available at Home: [employer] (uncertain as to type;adjustable bed)    EXAMINATION/PRESENTATION/DECISION MAKING:   Critical Behavior:  Neurologic State: Alert  Orientation Level: Oriented X4  Cognition: Follows commands     Hearing:   Auditory  Auditory Impairment: None  Skin:  dressing intact without visible drainage  Edema: none noted B feet and lower legs; expected post-op swelling L thigh. Range Of Motion:   AROM WFL RLE  AROM L ankle dorsiflexion WFL, knee flexion and hip flexion WFL for activities below                 Strength:        5/5 RLE dorsiflexion  Unable to tolerate resistance L dorsiflexion due to anticipatory pain; at least 3/5 observed in sitting              Tone & Sensation:             Normal LE tone with exception of reported L thigh spasms intermittently. Noted L anterior tib fasciculations with dorsiflexion                     Coordination:   WFL       Functional Mobility:  Bed Mobility:     Supine to Sit: Additional time;Assist x2;Bed Modified; Adaptive equipment; Moderate assistance  Sit to Supine: Assist x2; Additional time; Moderate assistance; Adaptive equipment;Bed Modified  Scooting: Minimum assistance (forward toward edge of bed)  Transfers:  Sit to Stand: Assist x2; Additional time; Moderate assistance  Stand to Sit: Assist x2; Additional time; Moderate assistance               Stands x 7 seconds with B knee flexion, TTWB to NWB LLE for comfort, states inability to tolerate continued standing or further mobility due to pain. Balance:   Sitting: Without support;High guard  Sitting - Static: Good (unsupported)  Sitting - Dynamic: Good (unsupported)  Standing: With support  Standing - Static: Fair  Ambulation/Gait Training:                       Left Side Weight Bearing: As tolerated                  Pt unable to tolerate. Therapeutic Exercises: Ankle pumps x 10 AROM RLE  Heel slides x 10 AAROM RLE  Ankle pumps 2 x 5 LLE, cues for breathing and relaxation techniques    Functional Measure:  Barthel Index:    Bathin  Bladder: 10  Bowels: 10  Groomin  Dressin  Feeding: 10  Mobility: 0  Stairs: 0  Toilet Use: 5  Transfer (Bed to Chair and Back): 5  Total: 50/100       The Barthel ADL Index: Guidelines  1.  The index should be used as a record of what a patient does, not as a record of what a patient could do. 2. The main aim is to establish degree of independence from any help, physical or verbal, however minor and for whatever reason. 3. The need for supervision renders the patient not independent. 4. A patient's performance should be established using the best available evidence. Asking the patient, friends/relatives and nurses are the usual sources, but direct observation and common sense are also important. However direct testing is not needed. 5. Usually the patient's performance over the preceding 24-48 hours is important, but occasionally longer periods will be relevant. 6. Middle categories imply that the patient supplies over 50 per cent of the effort. 7. Use of aids to be independent is allowed. Score Interpretation (from [address] [facility])    Independent   60-79 Minimally independent   40-59 Partially dependent   20-39 Very dependent   <20 Totally dependent     -Chito Rodriguez., Barthel, D.W. (1965). Functional evaluation: the Barthel Index. [address] [facility] [employer] ([address] (1997). The Barthel activities of daily living index: self-reporting versus actual performance in the old (> or = 75 years). Journal of [address] [place] [id number] 457), [facility], ..., [facility]., [facility]. (1999). Measuring the change in disability after inpatient rehabilitation; comparison of the responsiveness of the Barthel Index and Functional [place] Measure. Journal of Neurology, Neurosurgery, and Psychiatry, 66(4), 881-734. Clemencia Gilford, N.J.A, Connor Braden  WIggyJ.PAMELA, & David Johns M.A. (2004) Assessment of post-stroke quality of life in cost-effectiveness studies: The usefulness of the Barthel Index and the EuroQoL-5D.  Quality of Life Research, 15, 266-68            Physical Therapy Evaluation Charge Determination   History Examination Presentation Decision-Making   HIGH Complexity :3+ comorbidities / personal factors will impact the outcome/ POC  HIGH Complexity : 4+ Standardized tests and measures addressing body structure, function, activity limitation and / or participation in recreation  MEDIUM Complexity : Evolving with changing characteristics  Other outcome measures barthel  MEDIUM      Based on the above components, the patient evaluation is determined to be of the following complexity level: MEDIUM    Pain Ratin/10 (NPS) L hip and thigh. Pt pre-medicated by RN  Offered cold pack with toweling, repositioning, education, encouragement. Activity Tolerance:   Poor due to severity of pain    After treatment patient left in no apparent distress:   Supine in bed, Call bell within reach, Bed / chair alarm activated, and Side rails x 3    COMMUNICATION/EDUCATION:   The patients plan of care was discussed with: Registered nurse and Rehabilitation technician. Fall prevention education was provided and the patient/caregiver indicated understanding., Patient/family have participated as able in goal setting and plan of care. , and Patient/family agree to work toward stated goals and plan of care.     Thank you for this referral.  Azar Chung, PT, DPT   Time Calculation: 41 mins

## 2022-11-20 NOTE — PROGRESS NOTES
Orthopaedic Progress Note  Post Op day: 1 Day Post-Op    2022 9:22 AM     Patient: Dianne Castro MRN: [id number]  SSN: [id number]    YOB: 1945  Age: 78 y.o. Sex: male      Admit date:  2022  Date of Surgery:  2022   Procedures:  Procedure(s):  HIP PERCUTANEOUS PINNING   Admitting Physician:  Steve Opitz, MD   Surgeon:  Karina Del Rosario) and Role:     Terri Griffin MD - Primary    Consulting Physician(s): Treatment Team: Attending Provider: Devora Yuen MD; Consulting Provider: Tamar Rawls MD; Consulting Provider: GIACOMO Oden; Primary Nurse: Arianne Quiñones, NOEMY; Primary Nurse: Hesham Wheatley, RN; Primary Nurse: Case, Joanna Mcburney, RN    SUBJECTIVE:     Dianne Castro is a 78 y.o. male is 1 Day Post-Op s/p Procedure(s):  HIP PERCUTANEOUS PINNING FIXATION with an appropriate level of post-operative pain. No complaints of nausea, vomiting, dizziness, lightheadedness, chest pain, or shortness of breath. OBJECTIVE:       Physical Exam:  General: Alert, cooperative, no distress. Respiratory: Respirations unlabored  Neurological:  Neurovascular exam within normal limits. Motor: + DF/PF. Musculoskeletal: Left thigh supple: mild TTP, no erythema, scant ecchymosis/edema, Calves soft, supple, non-tender upon palpation. +PF/DF, good peripheral pulses. Dressing/Wound:  Clean, dry and intact.       Vital Signs:      Patient Vitals for the past 8 hrs:   BP Temp Pulse Resp SpO2   22 0811 138/71 97.7 °F (36.5 °C) 98 18 98 %   22 0441 (!) 147/82 98.7 °F (37.1 °C) (!) 104 18 95 %   22 0121 (!) 172/93 98.1 °F (36.7 °C) (!) 102 18 94 %   22 0116 -- 98.1 °F (36.7 °C) -- -- --                                          Temp (24hrs), Av.2 °F (36.8 °C), Min:97.4 °F (36.3 °C), Max:99.3 °F (37.4 °C)      Labs:        Recent Labs     22  0317   HCT 42.7   HGB 13.7     Lab Results   Component Value Date/Time    Sodium 139 11/20/2022 03:17 AM    Potassium 4.5 11/20/2022 03:17 AM    Chloride 106 11/20/2022 03:17 AM    CO2 24 11/20/2022 03:17 AM    Glucose 81 11/20/2022 03:17 AM    BUN 21 (H) 11/20/2022 03:17 AM    Creatinine 1.22 11/20/2022 03:17 AM    Calcium 9.2 11/20/2022 03:17 AM           ASSESSMENT / PLAN:   Active Problems:    Hip fracture ([address] (11/18/2022)       POD#1 s/p left hip percutaneous pinning for left femoral neck fracture    Pain Control: Percocet, morphine   DVT Prophylaxis: Lovenox   Hemodynamics: stable   Activity: WBAT LLE   Disposition: Home w/ Family when cleared by therapy.      Signed By:  Monica Espana, [facility]

## 2022-11-20 NOTE — PROGRESS NOTES
SUBJECTIVE:   CC: Guillermo Strauss is an 65 year old male who presents for preventative health visit.     Healthy Habits:    Do you get at least three servings of calcium containing foods daily (dairy, green leafy vegetables, etc.)? yes    Amount of exercise or daily activities, outside of work: 5-6 day(s) per week    Problems taking medications regularly No    Medication side effects: No    Have you had an eye exam in the past two years? yes    Do you see a dentist twice per year? yes    Do you have sleep apnea, excessive snoring or daytime drowsiness?no    Bumps on the top of the scalp. First noted 1.5-2 months ago.     Hyperlipidemia. Last   Lab Results   Component Value Date     05/13/2016     Anxiety. Intermittent. Helped by propranolol.     Today's PHQ-2 Score:   PHQ-2 ( 1999 [employer]) 5/13/2016 1/20/2015   Q1: Little interest or pleasure in doing things 0 -   Q2: Feeling down, depressed or hopeless 0 -   PHQ-2 Score 0 -   Q1: Little interest or pleasure in doing things - Not at all   Q2: Feeling down, depressed or hopeless - Not at all   PHQ-2 Score - 0       Abuse: Current or Past(Physical, Sexual or Emotional)- No  Do you feel safe in your environment - Yes    Social History   Substance Use Topics     Smoking status: Former Smoker     Smokeless tobacco: Never Used      Comment: quit in about 1985 after about 30 pack years     Alcohol use 0.0 oz/week     0 Standard drinks or equivalent per week     The patient does not drink >3 drinks per day nor >7 drinks per week.    Last PSA:   PSA   Date Value Ref Range Status   05/13/2016 1.94 0 - 4 ug/L Final       Reviewed orders with patient. Reviewed health maintenance and updated orders accordingly - Yes  Labs reviewed in EPIC    Reviewed and updated as needed this visit by clinical staff  Tobacco  Allergies  Meds  Med Hx  Surg Hx  Fam Hx  Soc Hx        Reviewed and updated as needed this visit by Provider  Tobacco  Allergies  Meds  Problems  Med Hx  11/20/2022  10:16 AM  Case management note    Reason for Admission:  hip fracture    Patient fell off horse. Patient is , independent with ADL's and drives. Patient has history of HTN. Patient will most likely need SNF, does not want [facility], but willing to [facility] to [facility], as well as [facility]  OBS educated and documented                     RUR Score:          low           Plan for utilizing home health:      PT/OT to eval    PCP: First and Last name:  Ange Galan DO     Name of Practice:    Are you a current patient: Yes/No: yes   Approximate date of last visit: 3 months   Can you participate in a virtual visit with your PCP:                     Current Advanced Directive/Advance Care Plan: Full Code  NO AD      Healthcare Decision MakerLawrnce Ege  spouse                               Transition of Care Plan:                   Unable to determine at this time. PCP follow up  Ortho follow up  AD planning  CM to follow for discharge needs    Care Management Interventions  PCP Verified by CM:  Yes (Negin Finley)  Support Systems: Spouse/Significant Other  Confirm Follow Up Transport: Family  The Plan for Transition of Care is Related to the Following Treatment Goals : hip fracture  Discharge Location  Patient Expects to be Discharged to[de-identified] Unable to determine at this time   [facility] " Surg Hx  Fam Hx  Soc Hx             ROS:  C: NEGATIVE for fever, chills, change in weight  INTEGUMENTARY/SKIN: Per HPI   E: NEGATIVE for vision changes or irritation  ENT: NEGATIVE for ear, mouth and throat problems  R: NEGATIVE for significant cough or SOB  CV: NEGATIVE for chest pain, palpitations or peripheral edema  GI: NEGATIVE for nausea, abdominal pain, heartburn, or change in bowel habits   male: negative for dysuria, hematuria, decreased urinary stream, erectile dysfunction, urethral discharge  M: NEGATIVE for significant arthralgias or myalgia  N: NEGATIVE for weakness, dizziness or paresthesias  PSYCHIATRIC: Per HPI     OBJECTIVE:   /74  Pulse 68  Temp 98  F (36.7  C) (Oral)  Ht 5' 5.75\" (1.67 m)  Wt 183 lb (83 kg)  SpO2 98%  BMI 29.76 kg/m2  EXAM:  GENERAL: healthy, alert and no distress  EYES: Eyes grossly normal to inspection, PERRL and conjunctivae and sclerae normal  HENT: ear canals and TM's normal, nose and mouth without ulcers or lesions  NECK: no adenopathy, no asymmetry, masses, or scars and thyroid normal to palpation  RESP: lungs clear to auscultation - no rales, rhonchi or wheezes  CV: regular rate and rhythm, normal S1 S2, no S3 or S4, no murmur, click or rub, no peripheral edema and peripheral pulses strong  ABDOMEN: soft, nontender, no hepatosplenomegaly, no masses and bowel sounds normal  RECTAL: deferred per pt  MS: no gross musculoskeletal defects noted, no edema  SKIN: approx 5 mm diameter clear nevus on the vertex of the scalp. Adjacent larger tan raised lesion.   NEURO: Normal strength and tone, mentation intact and speech normal  PSYCH: mentation appears normal, affect normal/bright    ASSESSMENT/PLAN:       ICD-10-CM    1. Routine general medical examination at a health care facility Z00.00 Comprehensive metabolic panel     Lipid Profile with reflex to direct LDL     PSA, screen   2. Hyperlipidemia LDL goal <130 E78.5 simvastatin (ZOCOR) 20 MG tablet     " "Comprehensive metabolic panel     Lipid Profile with reflex to direct LDL   3. Suspicious nevus D22.9 DERMATOLOGY REFERRAL   4. Performance anxiety F41.8 propranolol (INDERAL) 20 MG tablet   5. Need for hepatitis C screening test Z11.59 Hepatitis C Screen Reflex to HCV RNA Quant and Genotype   Nevus on scalp - question early basal cell. Dermatology referral placed.  Continue w/ other routine medications.   Follow-up in approx 1 year.     COUNSELING:  Reviewed preventive health counseling, as reflected in patient instructions    BP Screening:   Last 3 BP Readings:    BP Readings from Last 3 Encounters:   07/07/17 132/74   05/13/16 138/80   01/23/15 134/86       The following was recommended to the patient:  Re-screen BP within a year and recommended lifestyle modifications     reports that he has quit smoking. He has never used smokeless tobacco.    Estimated body mass index is 29.76 kg/(m^2) as calculated from the following:    Height as of this encounter: 5' 5.75\" (1.67 m).    Weight as of this encounter: 183 lb (83 kg).   Weight management plan: Discussed healthy diet and exercise guidelines and patient will follow up in 12 months in clinic to re-evaluate.      Kelton Lopez MD  [facility]  "

## 2022-11-20 NOTE — PROGRESS NOTES
Problem: Hip Fracture: Post-Op Day 1  Goal: Off Pathway (Use only if patient is Off Pathway)  Outcome: Progressing Towards Goal  Goal: Activity/Safety  Outcome: Progressing Towards Goal  Goal: Diagnostic Test/Procedures  Outcome: Progressing Towards Goal  Goal: Nutrition/Diet  Outcome: Progressing Towards Goal  Goal: Medications  Outcome: Progressing Towards Goal  Goal: Respiratory  Outcome: Progressing Towards Goal  Goal: Treatments/Interventions/Procedures  Outcome: Progressing Towards Goal  Goal: Psychosocial  Outcome: Progressing Towards Goal  Goal: Discharge Planning  Outcome: Progressing Towards Goal  Goal: *Demonstrates progressive activity  Outcome: Progressing Towards Goal  Goal: *Optimal pain control at patient's stated goal  Outcome: Progressing Towards Goal  Goal: *Hemodynamically stable  Outcome: Progressing Towards Goal  Goal: *Discharge plan identified  Outcome: Progressing Towards Goal  Goal: *Absence of skin breakdown  Outcome: Progressing Towards Goal

## 2022-11-20 NOTE — PERIOP NOTES
TRANSFER - OUT REPORT:    Verbal report given to Dimitri Chavira RN (name) on Malia Colon  being transferred to [address] [place] [address] (unit) for routine progression of care       Report consisted of patients Situation, Background, Assessment and   Recommendations(SBAR). Information from the following report(s) SBAR, OR Summary, and Recent Results was reviewed with the receiving nurse. Lines:   Peripheral IV 11/18/22 Right Forearm (Active)   Site Assessment Clean, dry, & intact 11/19/22 2039   Phlebitis Assessment 0 11/19/22 2039   Infiltration Assessment 0 11/19/22 2039   Dressing Status Clean, dry, & intact 11/19/22 2039   Dressing Type Tape;Transparent 11/19/22 2039   Hub Color/Line Status Pink; Infusing;Positional 11/19/22 2039   Action Taken Open ports on tubing capped 11/19/22 2039   Alcohol Cap Used Yes 11/19/22 2039        Opportunity for questions and clarification was provided.       Patient transported with:   O2 @ 2 liters  Registered Nurse

## 2022-11-20 NOTE — DISCHARGE INSTRUCTIONS
HIP FRACTURE DISCHARGE INSTRUCTIONS    Patient: Malia Colon MRN: [id number]  SSN: [id number]              Please take the time to review the following instructions before you leave the hospital and use them as guidelines during your recovery from surgery. If you have any questions you may contact my office at [phone]. SPECIAL INSTRUCTIONS :   1. You may walk as tolerated and are encouraged to work daily on progressing your activities with a walker initially. 2. Weight bearing status is Weight-bearing as tolerated  3. A walker should be used for mobilization at all times until cleared by Dr. Raghu Mchugh. Wound Care/ Dressing Changes:    DRESSING :   You may change your dressings after 7 days and then as needed following discharge from the hospital.  It isnt necessary to apply antibiotic ointment to your incisions. If you have steri-strips over your incision they will start to peel off in 7-10 days as you get them wet. They dont need to be removed before that. When they begin to peel off, you may remove them. Dermabond may be used to close the incision which appears as a thick covering over the incision. Do not pick or pull at this covering which should fall off naturally. Showering/ Bathing: If your incision is dry without drainage you may shower following your discharge home. Your dressing may be worn in the shower. You may get your incisions wet in the shower. Do not vigorously scrub your incisions. Apply a clean, dry dressing after you have dried your incisions. Do not take a bath or get into a swimming pool or [facility] until you follow up with Dr. Raghu Mchugh. Do not soak your incision under water. If there is continued drainage or you are concerned contact Dr. Get Cohn office prior to showering. Diet:  You may advance to your regular diet as tolerated. Increase your clear liquid intake for the next 2-3 days.     Medication:      You will be given prescriptions for pain medication when you are discharged from the hospital. The side effects of these medications can be substantial and the narcotic medications are not mandatory. You may substitute these medications with Tylenol and Alleve or Motrin. Please use the medications as prescribed. Pain medications may cause constipation- Colace or Milk of Magnesia may be used as needed. Other possible side effects of pain medication are dizziness, headache, nausea, vomiting, and urinary retention. Discontinue the pain medication if you develop itching, rash, shortness of breath, or difficulties swallowing. If these symptoms become severe or are not relieved by discontinuing the medication, you should seek immediate medical attention. Refills of pain medication are authorized during office hours only (8 AM- 5 PM  Monday thru Friday). Many of these medication will require you or a family member to pick-up a physical prescription at the office. Medications other than antiinflammatories will not be called into the pharmacy after business hours. Do not take Tylenol/Acetaminophen in addition to your pain medication as most pain medications already contain this ingredient. Do not exceed 4000mg of Tylenol/Acetaminophen per day. You may resume the medication(s) you were taking prior to your surgery. Narcotics may change the effects of some antidepressant medication(s). If you have any questions about possible interactions between your regular medications and the pain medication, you should ask the pharmacist or contact the prescribing physician. You will be discharged with prescriptions for additional pain medications (Tramadol or Toradol) and a medication for nausea and vomiting (Phenergan). You only need to fill these prescriptions if the primary pain medication is not working or you experiencing post-op nausea.    If you have constipation which is not improved by oral stool softeners then a Ducolax suppository should be purchased over the counter. Follow up appointment:    Please follow up at your scheduled appointment 2 weeks from your surgery. If you do not already have an appointment please call our office at [phone] for your follow up appointment. Physical Therapy / Nursing:    Physical Therapy following surgery will be arranged at home. They have specific instructions for rehab and wound care. .     Returning to work:    Normally we will keep you out of work until you return for your first follow up appointment from surgery. If you feel comfortable returning to work sooner, please call our office. You can discuss with Dr. Elana Adame if additional time off  is needed at your first follow up appointment after surgery. Important Signs and Symptoms:    If any of the following signs or symptoms occur, you should contact the office. Please be advised if a problem arises which you feel requires immediate medical attention or you are unable to contact the office you should seek immediate medical attention at the ER or other health care facility you have access to. A sudden increase in swelling and/or redness or warmth at the area your surgery was performed which isnt relieved by rest, ice, and elevation. Oral temperature greater than 101 degrees for 12 hours or more which isnt relieved by an increase in fluid intake and taking 2 Tylenol every 4-6 hours. Excessive drainage from your incisions, or drainage which hasnt stopped by 72 hours after your surgery. Fever, chills, shortness of breath, chest pain, nausea, vomiting or other signs and symptoms which are of concern to you.

## 2022-11-20 NOTE — ANESTHESIA POSTPROCEDURE EVALUATION
Procedure(s):  HIP PERCUTANEOUS PINNING VS FEMUR OPEN REDUCTION INTERNAL FIXATION. spinal, general    Anesthesia Post Evaluation      Multimodal analgesia: multimodal analgesia not used between 6 hours prior to anesthesia start to PACU discharge  Patient location during evaluation: PACU  Patient participation: complete - patient participated  Level of consciousness: awake  Pain management: adequate  Airway patency: patent  Anesthetic complications: no  Cardiovascular status: acceptable, blood pressure returned to baseline and hemodynamically stable  Respiratory status: acceptable  Hydration status: acceptable  Post anesthesia nausea and vomiting:  controlled  Final Post Anesthesia Temperature Assessment:  Normothermia (36.0-37.5 degrees C)      INITIAL Post-op Vital signs:   Vitals Value Taken Time   /74 11/19/22 2145   Temp 36.7 °C (98 °F) 11/19/22 2037   Pulse 93 11/19/22 2145   Resp 18 11/19/22 2145   SpO2 92 % 11/19/22 2147   Vitals shown include unvalidated device data.

## 2022-11-20 NOTE — PROGRESS NOTES
Problem: Falls - Risk of  Goal: *Absence of Falls  Description: Document Karon Donis Fall Risk and appropriate interventions in the flowsheet.   Outcome: Progressing Towards Goal  Note: Fall Risk Interventions:            Medication Interventions: Bed/chair exit alarm, Patient to call before getting OOB, Teach patient to arise slowly    Elimination Interventions: Call light in reach, Bed/chair exit alarm    History of Falls Interventions: Bed/chair exit alarm         Problem: Patient Education: Go to Patient Education Activity  Goal: Patient/Family Education  Outcome: Progressing Towards Goal     Problem: Hip Fracture:Day of Admission Pre-op  Goal: Off Pathway (Use only if patient is Off Pathway)  Outcome: Progressing Towards Goal  Goal: Activity/Safety  Outcome: Progressing Towards Goal  Goal: Consults, if ordered  Outcome: Progressing Towards Goal  Goal: Diagnostic Test/Procedures  Outcome: Progressing Towards Goal  Goal: Nutrition/Diet  Outcome: Progressing Towards Goal  Goal: Medications  Outcome: Progressing Towards Goal  Goal: Respiratory  Outcome: Progressing Towards Goal  Goal: Treatments/Interventions/Procedures  Outcome: Progressing Towards Goal  Goal: Psychosocial  Outcome: Progressing Towards Goal  Goal: *Labs/Tests Within Defined Limits in Preparation for Surgery  Outcome: Progressing Towards Goal  Goal: *Optimal pain control at patient's stated goal  Outcome: Progressing Towards Goal  Goal: *Hemodynamically stable  Outcome: Progressing Towards Goal     Problem: Hip Fracture: Post-Op Day 1  Goal: Off Pathway (Use only if patient is Off Pathway)  Outcome: Progressing Towards Goal  Goal: Activity/Safety  Outcome: Progressing Towards Goal  Goal: Diagnostic Test/Procedures  Outcome: Progressing Towards Goal  Goal: Nutrition/Diet  Outcome: Progressing Towards Goal  Goal: Medications  Outcome: Progressing Towards Goal  Goal: Respiratory  Outcome: Progressing Towards Goal  Goal: Treatments/Interventions/Procedures  Outcome: Progressing Towards Goal  Goal: Psychosocial  Outcome: Progressing Towards Goal  Goal: Discharge Planning  Outcome: Progressing Towards Goal  Goal: *Demonstrates progressive activity  Outcome: Progressing Towards Goal  Goal: *Optimal pain control at patient's stated goal  Outcome: Progressing Towards Goal  Goal: *Hemodynamically stable  Outcome: Progressing Towards Goal  Goal: *Discharge plan identified  Outcome: Progressing Towards Goal  Goal: *Absence of skin breakdown  Outcome: Progressing Towards Goal

## 2022-11-20 NOTE — PROGRESS NOTES
[facility]  [address] [facility], [facility] [address]  [phone]         Hospitalist Progress Note        NAME:  Ludy Padilla   :  1945   MRN:  [id number]    Date/Time:  2022     Patient PCP:  Jhonatan Armstrong DO    Emergency Contact:    Extended Emergency Contact Information  Primary Emergency Contact: Sidney Asher  Address: [address] [place] [address] Phone: [phone]  Relation: Spouse      Code: Full Code     Isolation Precautions: There are currently no Active Isolations        Subjective:     REASON FOR VISIT:  Recheck femur fracture    HPI & INTERVAL HISTORY:     Mr. Annalee Steel is a 78 y.o. male with history of HTN presents after falling from his horse. He has been in his normal state of health without any chest pain, sob, cough, fever, chills, etc. Simply got caught in the stirrup and went down on his left hip. He is unable to lay flat to tolerate CT.     : Appears to be resting comfortably. Does report continuous moderate and at times severe pain with movement of the left hip rating it at minimum 4-5 out of 10 but can be severe with movement. Percocet takes the edge off and morphine helped significantly when needed. Seems to have tolerated surgical hip repair yesterday well    : The patient was seen and examined. Reports that he is having constant moderate left hip pain which becomes severe with movement due to the broken hip. Pain medications are helping. Scheduled for surgery today.       ALLERGIES  No Known Allergies      ROS:  Gen:   Negative  Eyes:  negative  ENT:    negative  Resp:  negative  CVS:   negative  GI:       negative  :     negative  MS:     negative and left hip pain         Objective:      Visit Vitals  /71 (BP 1 Location: Left upper arm, BP Patient Position: At rest)   Pulse 98   Temp 97.7 °F (36.5 °C)   Resp 18   Ht 6' (1.829 m)   Wt 88.5 kg (195 lb) SpO2 98%   BMI 26.45 kg/m²       Physical Exam:  General: No acute distress  Head: Normocephalic, without obvious abnormality, atraumatic  Eyes: anicteric sclerae and conjuntiva clear  ENT: lips, mucosa, and tongue normal  Neck: normal, supple, and no tenderness  Lungs: clear to auscultation  Heart: S1, S2 normal, regular rate, and regular rhythm  Abd: not distended, soft, nontender, BS present and normactive  Ext: no cyanosis and no edema  Skin: normal skin color, no rashes, and texture normal  Neuro:  alert, oriented, no defects noted in general exam.  Psych: not anxious, cooperative, appropriate affect       Medications:  Current Facility-Administered Medications   Medication Dose Route Frequency    ceFAZolin (ANCEF) 2 g in sterile water (preservative free) 20 mL IV syringe  2 g IntraVENous Q8H    lisinopriL (PRINIVIL, ZESTRIL) tablet 20 mg  20 mg Oral DAILY    sodium chloride (NS) flush 5-40 mL  5-40 mL IntraVENous Q8H    sodium chloride (NS) flush 5-40 mL  5-40 mL IntraVENous PRN    acetaminophen (TYLENOL) tablet 650 mg  650 mg Oral Q6H PRN    Or    acetaminophen (TYLENOL) suppository 650 mg  650 mg Rectal Q6H PRN    polyethylene glycol (MIRALAX) packet 17 g  17 g Oral DAILY PRN    ondansetron (ZOFRAN ODT) tablet 4 mg  4 mg Oral Q8H PRN    Or    ondansetron (ZOFRAN) injection 4 mg  4 mg IntraVENous Q6H PRN    enoxaparin (LOVENOX) injection 40 mg  40 mg SubCUTAneous DAILY    morphine injection 4 mg  4 mg IntraVENous Q3H PRN    oxyCODONE-acetaminophen (PERCOCET) 5-325 mg per tablet 1 Tablet  1 Tablet Oral Q4H PRN        Labs:  Recent Labs     11/20/22 0317   WBC 13.2*   HGB 13.7   HCT 42.7          Recent Labs     11/20/22 0317 11/18/22  1812    139   K 4.5 3.9    105   CO2 24 26   GLU 81 102*   BUN 21* 21*   CREA 1.22 1.20   CA 9.2 9.8   MG  --  2.2   ALB  --  4.1   TBILI  --  0.8   ALT  --  28           Radiology:  [facility] TECHNOLOGIST SERVICE    Result Date: 11/19/2022  Left hip procedure in progress. INTERPRETATION PROVIDED FOR COMPLIANCE ONLY AT NO CHARGE     I personally reviewed and interpreted the imaging studies and agree with official reading    The labs, imaging studies, medications, and consultants notes was reviewed by me on: November 20, 2022         Assessment/Plan:      Left femoral neck impaction fracture as result of falling from horse:   POD # 1 HIP PERCUTANEOUS PINNING VS FEMUR ORIF  Continue pain control with Tylenol for mild, Roxicodone for moderate, and IV morphine for severe pain   Ortho managing     Leukocytosis POA likely due to fracture  Improved. We will continue to monitor. No need for antibiotics.       HTN:   On lisinopril    Body mass index is 26.45 kg/m².:  25.0 - 29.9:  Overweight    F: None  E: Monitor  N: ADULT DIET Regular       Risk of deterioration: high      Discussed:  Pt's condition, Imaging findings, Lab findings, Assessment, and Care Plan discussed with: Patient and RN    Prophylaxis:  Lovenox SQ    Anticipated discharge disposition:  SNF    HR DC Barriers: Currently not medically stable for discharge and Needs treatment/procedures hip surgery      Total time: 30 minutes **I personally saw and examined the patient during this time period**      Date of service:    11/20/2022                ___________________________________________________    Admitting Physician: Hoda Dorsey MD

## 2022-11-20 NOTE — PROGRESS NOTES
Bedside shift change report given to Ben Nevarez (oncoming nurse) by Joellen Calhoun (offgoing nurse). Report included the following information SBAR, Kardex, Procedure Summary, Intake/Output, MAR, and Recent Results.

## 2022-11-21 LAB
ATRIAL RATE: 87 BPM
BASOPHILS # BLD: 0.1 K/UL (ref 0–0.1)
BASOPHILS NFR BLD: 0 % (ref 0–1)
CALCULATED P AXIS, ECG09: 51 DEGREES
CALCULATED R AXIS, ECG10: 85 DEGREES
CALCULATED T AXIS, ECG11: 15 DEGREES
DIAGNOSIS, 93000: NORMAL
DIFFERENTIAL METHOD BLD: ABNORMAL
EOSINOPHIL # BLD: 0.4 K/UL (ref 0–0.4)
EOSINOPHIL NFR BLD: 3 % (ref 0–7)
ERYTHROCYTE [DISTWIDTH] IN BLOOD BY AUTOMATED COUNT: 13 % (ref 11.5–14.5)
HCT VFR BLD AUTO: 39.9 % (ref 36.6–50.3)
HGB BLD-MCNC: 12.8 G/DL (ref 12.1–17)
IMM GRANULOCYTES # BLD AUTO: 0.1 K/UL (ref 0–0.04)
IMM GRANULOCYTES NFR BLD AUTO: 1 % (ref 0–0.5)
LYMPHOCYTES # BLD: 1.1 K/UL (ref 0.8–3.5)
LYMPHOCYTES NFR BLD: 9 % (ref 12–49)
MCH RBC QN AUTO: 28.8 PG (ref 26–34)
MCHC RBC AUTO-ENTMCNC: 32.1 G/DL (ref 30–36.5)
MCV RBC AUTO: 89.9 FL (ref 80–99)
MONOCYTES # BLD: 1.1 K/UL (ref 0–1)
MONOCYTES NFR BLD: 9 % (ref 5–13)
NEUTS SEG # BLD: 9 K/UL (ref 1.8–8)
NEUTS SEG NFR BLD: 78 % (ref 32–75)
NRBC # BLD: 0 K/UL (ref 0–0.01)
NRBC BLD-RTO: 0 PER 100 WBC
P-R INTERVAL, ECG05: 172 MS
PLATELET # BLD AUTO: 245 K/UL (ref 150–400)
PMV BLD AUTO: 10.3 FL (ref 8.9–12.9)
Q-T INTERVAL, ECG07: 374 MS
QRS DURATION, ECG06: 90 MS
QTC CALCULATION (BEZET), ECG08: 450 MS
RBC # BLD AUTO: 4.44 M/UL (ref 4.1–5.7)
VENTRICULAR RATE, ECG03: 87 BPM
WBC # BLD AUTO: 11.7 K/UL (ref 4.1–11.1)

## 2022-11-21 PROCEDURE — 36415 COLL VENOUS BLD VENIPUNCTURE: CPT

## 2022-11-21 PROCEDURE — 97110 THERAPEUTIC EXERCISES: CPT

## 2022-11-21 PROCEDURE — 74011250637 HC RX REV CODE- 250/637: Performed by: HOSPITALIST

## 2022-11-21 PROCEDURE — 97165 OT EVAL LOW COMPLEX 30 MIN: CPT

## 2022-11-21 PROCEDURE — 74011250636 HC RX REV CODE- 250/636: Performed by: INTERNAL MEDICINE

## 2022-11-21 PROCEDURE — 97535 SELF CARE MNGMENT TRAINING: CPT

## 2022-11-21 PROCEDURE — 74011000250 HC RX REV CODE- 250: Performed by: INTERNAL MEDICINE

## 2022-11-21 PROCEDURE — 65270000029 HC RM PRIVATE

## 2022-11-21 PROCEDURE — 74011250637 HC RX REV CODE- 250/637: Performed by: INTERNAL MEDICINE

## 2022-11-21 PROCEDURE — 97530 THERAPEUTIC ACTIVITIES: CPT

## 2022-11-21 PROCEDURE — 74011250637 HC RX REV CODE- 250/637: Performed by: PHYSICIAN ASSISTANT

## 2022-11-21 PROCEDURE — 85025 COMPLETE CBC W/AUTO DIFF WBC: CPT

## 2022-11-21 PROCEDURE — 97116 GAIT TRAINING THERAPY: CPT

## 2022-11-21 RX ADMIN — CYCLOBENZAPRINE 10 MG: 10 TABLET, FILM COATED ORAL at 01:36

## 2022-11-21 RX ADMIN — SODIUM CHLORIDE, PRESERVATIVE FREE 10 ML: 5 INJECTION INTRAVENOUS at 06:00

## 2022-11-21 RX ADMIN — OXYCODONE AND ACETAMINOPHEN 1 TABLET: 5; 325 TABLET ORAL at 01:36

## 2022-11-21 RX ADMIN — SODIUM CHLORIDE, PRESERVATIVE FREE 10 ML: 5 INJECTION INTRAVENOUS at 20:23

## 2022-11-21 RX ADMIN — OXYCODONE AND ACETAMINOPHEN 1 TABLET: 5; 325 TABLET ORAL at 08:08

## 2022-11-21 RX ADMIN — LISINOPRIL 20 MG: 20 TABLET ORAL at 08:04

## 2022-11-21 RX ADMIN — ENOXAPARIN SODIUM 40 MG: 100 INJECTION SUBCUTANEOUS at 08:04

## 2022-11-21 RX ADMIN — OXYCODONE AND ACETAMINOPHEN 1 TABLET: 5; 325 TABLET ORAL at 14:28

## 2022-11-21 RX ADMIN — OXYCODONE AND ACETAMINOPHEN 1 TABLET: 5; 325 TABLET ORAL at 20:23

## 2022-11-21 NOTE — PROGRESS NOTES
Occupational Therapy    Orders received, chart reviewed and patient evaluated by occupational therapy. Pending progression with skilled acute occupational therapy, recommend: To be determined: IPR vs HH pending progression      Recommend with nursing ADLs with supervision/setup, OOB to chair 3x/day and toileting via beside commode and functional mobility to and from bathroom min-mod assist, gait belt and walker. Thank you for completing as able in order to maintain patient strength, endurance and independence. Full evaluation to follow.        Thank you,    Myla Ching, OT

## 2022-11-21 NOTE — PROGRESS NOTES
Spiritual Care Partner Volunteer visited patient at [address] in [facility] 4M POST SURG ORT 1 on 11/21/2022   Documented by:  JOHNSON LeoDiv.  [phone])

## 2022-11-21 NOTE — PROGRESS NOTES
11/21/2022  2:24 PM  Care Management Progress Note      ICD-10-CM ICD-9-CM    1. Fall from horse, initial encounter  V80.010A E828.2       2. Closed fracture of left hip, initial encounter ([facility] [address]  S72.002A 820.8           RUR:  7%  Risk Level: [x]Low []Moderate []High  Value-based purchasing: [] Yes [x] No  Bundle patient: [] Yes [x] No   Specify:     Transition of care plan:  Awaiting medical clearance and DC order. Ortho following. PT/OT treating. TBD - Likely HH pending continuing progression with therapy. Outpatient follow-up. Transport need TBD.

## 2022-11-21 NOTE — PROGRESS NOTES
Problem: Self Care Deficits Care Plan (Adult)  Goal: *Acute Goals and Plan of Care (Insert Text)  Description: FUNCTIONAL STATUS PRIOR TO ADMISSION: Patient was independent and active without use of DME. He had fall when getting out of horse saddle. HOME SUPPORT: The patient lived with wife and daughter but did not require assist.    Occupational Therapy Goals  Initiated 11/21/2022  1. Patient will perform grooming in standing with supervision/set-up within 7 day(s). 2.  Patient will perform LB dressing with AD with minimal assistance within 7 day(s). 3.  Patient will perform toilet transfers with supervision/set-up within 7 day(s). 5.  Patient will perform all aspects of toileting with supervision/set-up within 7 day(s). 6.  Patient will participate in upper extremity therapeutic exercise/activities with supervision/set-up for 7 minutes within 7 day(s). 7.  Patient will utilize energy conservation techniques during functional activities with verbal cues within 7 day(s). Outcome: Progressing Towards Goal   OCCUPATIONAL THERAPY EVALUATION  Patient: Carlos Alberto Romeo (82 y.o. male)  Date: 11/21/2022  Primary Diagnosis: Hip fracture (HCC) [S72.009A]  Procedure(s) (LRB):  HIP PERCUTANEOUS PINNING VS FEMUR OPEN REDUCTION INTERNAL FIXATION (Left) 2 Days Post-Op   Precautions:  WBAT, Fall    ASSESSMENT  Based on the objective data described below, the patient presents with impaired activity tolerance, standing balance, mobility and pain impacting his ability to complete ADL tasks s/p from from horseback with L femur fx now POD 2 from pinning. Nursing cleared for therapy and patient motivated to attempt. He completed supine > sit with min A for assist with advancing LLE. Good static sitting balance however limited with forward reach for LB care tasks secondary to pain. Sit <> stand with CGA at RW level and demo ability to ambulate bathroom distance with RW and CGA.   Unable to tolerate standing for oral care tasks, completed in sitting with set up. Noted one episode of confusion with pouring water from pitcher to cup. Left up in chair. Recommend next session for LB dressing ed. Current Level of Function Impacting Discharge (ADLs/self-care): oral care in chair set up, self care transfer with CGA at  level. Functional Outcome Measure: The patient scored 55/100 on the Barthel Index outcome measure which is indicative of mild impairment with ADL tasks. Other factors to consider for discharge: He is motivated for dc home with support from family. Patient will benefit from skilled therapy intervention to address the above noted impairments. PLAN :  Recommendations and Planned Interventions: self care training, functional mobility training, therapeutic exercise, balance training, therapeutic activities, endurance activities, patient education, home safety training, and family training/education    Frequency/Duration: Patient will be followed by occupational therapy 5 times a week to address goals. Recommendation for discharge: (in order for the patient to meet his/her long term goals)  To be determined: IPR vs HH pending progression    This discharge recommendation:  Has been made in collaboration with the attending provider and/or case management    IF patient discharges home will need the following DME: AE: long handled bathing, AE: long handled dressing, and walker: rolling       SUBJECTIVE:   Patient stated I need to sit to do that.     OBJECTIVE DATA SUMMARY:   HISTORY:   Past Medical History:   Diagnosis Date    Arthritis     marc shoulders and right hip    Cancer ([facility] [address]     kidney, (surgery to remove 1/4 of right kidney.)    GERD (gastroesophageal reflux disease)     Hemorrhoids     High cholesterol     Hypertension     Ill-defined condition     cataracts--no surgery yet    Other ill-defined conditions(799.89) 8/2012    right renal mass newly diagnosed    Other ill-defined conditions(799.89)     heart murmur    Prostate infection     resolved    Psychiatric disorder     panic attacks, last one 9/2013; anxiety    Unspecified sleep apnea 2006    surgery- excision of uvula; 2013 using dental mouthpiece     Past Surgical History:   Procedure Laterality Date    COLONOSCOPY N/A 8/9/2021    COLONOSCOPY performed by Linette Guzmán MD at [facility]      as a child    HX CHOLECYSTECTOMY  10/30/2013    lap choley    HX HEENT  2006    excision of uvula    HX HERNIA REPAIR      rih repair age 8 and again 5 years ago, LIH 27 years ago    [facility]    right knee athroscopy    HX ORTHOPAEDIC Right 2016    hip replacement    HX SHOULDER REPLACEMENT Left 2014    HX SHOULDER REPLACEMENT Right 2015    HX TONSILLECTOMY      HX UROLOGICAL  2012    right partial nephrectomy    [place] EXCISION OF UVULA         Expanded or extensive additional review of patient history:     Home Situation  Home Environment: Private residence  # Steps to Enter: 5  Rails to Enter: Yes  Hand Rails : Bilateral  One/Two Story Residence: Two story, live on 1st floor  # of Interior Steps: 14  Living Alone: No  Support Systems: Spouse/Significant Other  Patient Expects to be Discharged to[de-identified] Unable to determine at this time  Current DME Used/Available at Home: Druscilla Covert (uncertain as to type;adjustable bed)  Tub or Shower Type: Shower (with built in seat)    Hand dominance: Right    EXAMINATION OF PERFORMANCE DEFICITS:  Cognitive/Behavioral Status:  Neurologic State: Alert  Orientation Level: Oriented X4  Cognition: Follows commands           Hearing: Auditory  Auditory Impairment: None         Range of Motion:  BUE WDL            Strength:  BUE WDL      Coordination:   Fine motor: WDL  Gross motor: WDL            Tone & Sensation:  BUE WDL         Balance:  Sitting: Intact  Standing: Impaired; With support  Standing - Static: Constant support; Fair  Standing - Dynamic : Fair    Functional Mobility and Transfers for ADLs:  Bed Mobility:  Supine to Sit: Minimum assistance; Additional time  Scooting: Contact guard assistance; Additional time    Transfers:  Sit to Stand: Assist x2; Additional time;Contact guard assistance  Stand to Sit: Contact guard assistance;Assist x1;Additional time    ADL Assessment:  Feeding: Independent    Oral Facial Hygiene/Grooming: Supervision;Setup (seated)    Bathing: Moderate assistance    Type of Bath: Chlorhexidine (CHG); Full    Upper Body Dressing: Setup;Supervision    Lower Body Dressing: Maximum assistance    Toileting: Minimum assistance                  ADL Intervention and task modifications:     Patient instructed and indicated understanding the benefits of maintaining activity tolerance, functional mobility, and independence with self care tasks during acute stay  to ensure safe return home and to baseline. Encouraged patient to increase frequency and duration OOB, be out of bed for all meals, perform daily ADLs (as approved by RN/MD regarding bathing etc), and performing functional mobility to/from bathroom. Provided education through multi-modal cues for RW safety including proper positioning, hand placement,  and safety. Patient able to perform sit <> stand with CGA assistance. Fair understanding of RW use and safety. Provided education with patient on fall prevention for hospital and at home. This includes not getting OOB/chair/toilet without staff assistance, good lighting, good footwear, and recommended AD use. Patient with fair understanding. Activated chair alarm. Grooming  Position Performed: Seated edge of bed  Brushing Teeth: Set-up; Supervision  Cues: Verbal cues provided (confusion with pouring water from pitcher)         Functional Measure:    Barthel Index:  Bathin  Bladder: 10  Bowels: 10  Groomin  Dressin  Feeding: 10  Mobility: 0  Stairs: 0  Toilet Use: 5  Transfer (Bed to Chair and Back): 10  Total: 55/100      The Barthel ADL Index: Guidelines  1. The index should be used as a record of what a patient does, not as a record of what a patient could do. 2. The main aim is to establish degree of independence from any help, physical or verbal, however minor and for whatever reason. 3. The need for supervision renders the patient not independent. 4. A patient's performance should be established using the best available evidence. Asking the patient, friends/relatives and nurses are the usual sources, but direct observation and common sense are also important. However direct testing is not needed. 5. Usually the patient's performance over the preceding 24-48 hours is important, but occasionally longer periods will be relevant. 6. Middle categories imply that the patient supplies over 50 per cent of the effort. 7. Use of aids to be independent is allowed. Score Interpretation (from [address] [address])    Independent   60-79 Minimally independent   40-59 Partially dependent   20-39 Very dependent   <20 Totally dependent     -Chito Rodriguez., Barthel, D.W. (1965). Functional evaluation: the Barthel Index. [facility] ([address] (1997). The Barthel activities of daily living index: self-reporting versus actual performance in the old (> or = 75 years). Journal of [address] [place] [address] 457), [facility], IggyIggyM., Carmine Francois., Helen Kaufman. (1999). Measuring the change in disability after inpatient rehabilitation; comparison of the responsiveness of the Barthel Index and Functional Comal Measure. Journal of Neurology, Neurosurgery, and Psychiatry, 66(4), 867-260. Maya Bishop, N.J.A, NATHALIE Long, & Marlene Foreman M.A. (2004) Assessment of post-stroke quality of life in cost-effectiveness studies: The usefulness of the Barthel Index and the EuroQoL-5D.  Quality of Life Research, 15, 421-32         Occupational Therapy Evaluation Charge Determination   History Examination Decision-Making   LOW Complexity : Brief history review  LOW Complexity : 1-3 performance deficits relating to physical, cognitive , or psychosocial skils that result in activity limitations and / or participation restrictions  LOW Complexity : No comorbidities that affect functional and no verbal or physical assistance needed to complete eval tasks       Based on the above components, the patient evaluation is determined to be of the following complexity level: LOW   Pain Ratin/10 in sitting, 8/10 with transitional movement    Activity Tolerance:   Fair    After treatment patient left in no apparent distress:    Sitting in chair, Call bell within reach, and Bed / chair alarm activated    COMMUNICATION/EDUCATION:   The patients plan of care was discussed with: Physical therapist and Registered nurse. Home safety education was provided and the patient/caregiver indicated understanding. and Patient/family agree to work toward stated goals and plan of care. This patients plan of care is appropriate for delegation to [facility].     Thank you for this referral.  Karishma Haywood, OT  Time Calculation: 20 mins

## 2022-11-21 NOTE — PROGRESS NOTES
Problem: Hip Fracture: Post-Op Day 2  Goal: Off Pathway (Use only if patient is Off Pathway)  Outcome: Progressing Towards Goal  Goal: Activity/Safety  Outcome: Progressing Towards Goal  Goal: Diagnostic Test/Procedures  Outcome: Progressing Towards Goal  Goal: Nutrition/Diet  Outcome: Progressing Towards Goal  Goal: Medications  Outcome: Progressing Towards Goal  Goal: Respiratory  Outcome: Progressing Towards Goal  Goal: Treatments/Interventions/Procedures  Outcome: Progressing Towards Goal  Goal: Psychosocial  Outcome: Progressing Towards Goal  Goal: Discharge Planning  Outcome: Progressing Towards Goal  Goal: *Optimal pain control at patient's stated goal  Outcome: Progressing Towards Goal  Goal: *Hemodynamically stable  Outcome: Progressing Towards Goal  Goal: *Adequate oxygenation  Outcome: Progressing Towards Goal

## 2022-11-21 NOTE — OP NOTES
Operative Note    Patient: Ludy Padilla  YOB: 1945  MRN: [id number]    Date of Procedure: 11/19/2022     Pre-Op Diagnosis: Left Femoral Neck Fracture    Post-Op Diagnosis: Same as preoperative diagnosis. Procedure(s):  HIP PERCUTANEOUS PINNING VS FEMUR OPEN REDUCTION INTERNAL FIXATION    Surgeon(s):  Audra Conde MD    Surgical Assistant: Surg Asst-1: Erendira Rdz LPN    Anesthesia: Spinal     Estimated Blood Loss (mL):  Minimal    Complications: None    Specimens: * No specimens in log *     Implants:   Implant Name Type Inv. Item Serial No.  Lot No. LRB No. Used Action   PLATE BONE 1 H TI ALLOY FOR FEM NK SYS - SNA  PLATE BONE 1 H TI ALLOY FOR FEM NK SYS NA [employer] [id number] Left 1 Implanted   BOLT BONE L85MM [id number] GLD TI ALLOY FOR FEM NK SYS - SNA  BOLT BONE L85MM [id number] GLD TI ALLOY FOR FEM NK SYS NA [employer] [id number] Left 1 Implanted   SCREW BONE L50MM DIA5MM TI ALLOY LCK ST W/ T25 STARDRV - SNA  SCREW BONE L50MM DIA5MM TI ALLOY LCK ST W/ T25 STARDRV NA [employer]WD [id number] Left 1 Implanted   SCREW BONE L85MM DIA6.4MM FEROZ TI ALLOY ANTIROTATION T25 - SNA  SCREW BONE L85MM DIA6.4MM FEROZ TI ALLOY ANTIROTATION T25 NA [employer]WD [id number] Left 1 Implanted       Drains: * No LDAs found *    Findings: Non displaced femoral neck fracture    Indication for Procedure:    Mr. Annalee Steel is a pleasant 79 yo M who presented with  with a left hip fracture s/p Fall. Per the patient, he was trying to get off his horse when he left foot got caught in the stirups. He fell from height landing onto his left hip. Denies previous hx of trauma or falls. They were seen at an urgent care/ER where xrays were taken that confirmed the injury. I evaluated the patient in clinic and reviews the imaging and physical exam finding. After a long discussion with the patient about goals of care and treatment options.  We decided to proceed with operative intervention    The risks, benefits, and alternative treatments options were explained and reviewed in detail with the patient. Risk included but not limited to infection, bleeding, injury to neurovascular structures, potential non-union, mal-union, pain, hardware failure, death, and risk of anesthesia itself. The patient verbalized understanding the risks and benefits and voluntarily provided informed written consent. Detailed Description of Procedure: The patient was seen in the preoperative holding area and identified per the [facility] identification protocol. The site and side were confirmed and the patient was marked by Dr. Juan C Dumont. Once again, the procedure risks and benefits were explained and any questions the patient had were answered. The written consent for the operation, anesthesia, and blood products were confirmed by the attending, OR nurse, and anesthesia attending. The patient was subsequently brought back to the operative theater and placed in supine position on a regular table. An initial timeout was performed confirming the patients name, date of birth, procedures to be performed as well as laterality. All present agreed they could visualize the attending surgeon initials on the operative extremity. All medical personnel introduced themselves and role in the case. The patient then underwent general endotracheal anesthesia. All bony prominences were well padded. The patient operative extremity was then prepped and draped in the usual sterile fashion. A final time was performed confirming the patients name, date of birth, procedure to be performed, anticipated blood loss, equipment/implant check, operative extremity, and medical personal introductions. All present were in agreement. The patient was given preoperative antibiotics 2g IV ancef. Prior to the start of the case, orthogonal views the injured hip were taken to confirm that the fracture had not displaced.     Using a 10 blade scalpel a 6 cm incision was made on the lateral side of the femur through skin subcutaneous tissue and fascia over the lateralis. Then a 3.2 mm guidewire was inserted with the use of a canula savannah the center of the femoral neck. Orthogonal flouro views were taken to make sure that the guidewire was in the perfect position both in the AP and lateral views. Then the length of the screw and bolt were determined. Then a reamer was passed over the guidewire to the appropriate measured length. Then the femoral neck system implant was a mallet it down until the side of the plate was up against the lateral femur. First, the locking screw was placed at the level of the lesser troch. Then the anti-rotational screw was drilled in place. The guide was removed and final x-rays in AP and lateral were taken to confirm proper placement of the femoral neck system. The wound was then irrigated with normal saline. The incision was closed in layered fashion. 0 Vicryl for the fascia, 2-0 vicryl for the subcutaneous tissue, and 3-0 Monocryl for the skin. The skin was then cleaned and a sterile dressing was placed. The instruments, sponge, and needle counts were all correct at the end of the case. I was presented and made critical decisions in the pre operative, intra-op, and post operative care of this patient in question.     Post Op Plan:  Pain Control as directed (OTC and Prescription medication sent to pharmacy)  WB Status:  As tolerated  PTOT eval  DVT prophylaxis per primary team  Follow-up postoperative x-rays in a.m. hemoglobin  Consult case management    Electronically Signed by Tyson Cardona MD on 11/21/2022 at 11:39 AM

## 2022-11-21 NOTE — PROGRESS NOTES
Problem: Mobility Impaired (Adult and Pediatric)  Goal: *Acute Goals and Plan of Care (Insert Text)  Description: FUNCTIONAL STATUS PRIOR TO ADMISSION: Patient was independent and active without use of DME.    HOME SUPPORT PRIOR TO ADMISSION: The patient lived with spouse and did not require assistance. Physical Therapy Goals  Initiated 11/20/2022  1. Patient will move from supine to sit and sit to supine  in bed with supervision/set-up within 7 day(s). 2.  Patient will transfer from bed to chair and chair to bed with supervision/set-up using the least restrictive device within 7 day(s). 3.  Patient will perform sit to stand with supervision/set-up within 7 day(s). 4.  Patient will ambulate with supervision/set-up for 100 feet with the least restrictive device within 7 day(s). 5.  Patient will ascend/descend 4 stairs with one handrail(s) with minimal assistance/contact guard assist within 7 day(s). 11/21/2022 1356 by Carina Anguiano  Outcome: Progressing Towards Goal  Note:   PHYSICAL THERAPY TREATMENT  Patient: Glenis Carcamo (34 y.o. male)  Date: 11/21/2022  Diagnosis: Hip fracture ([facility] [address] [S72.009A] <principal problem not specified>  Procedure(s) (LRB):  HIP PERCUTANEOUS PINNING VS FEMUR OPEN REDUCTION INTERNAL FIXATION (Left) 2 Days Post-Op  Precautions: WBAT, Fall  Chart, physical therapy assessment, plan of care and goals were reviewed. ASSESSMENT  Patient continues with skilled PT services and is progressing towards goals. Reports 5/10   hip pain at rest, reports abdominal pain \"as bad or worse\" CGA for tranfers, verbal cues for safe technique. Improved knee extension with time and tolerated increased distance with gait training with 3 standing rest breaks. Encouraged OOB for meals and assist from nursing staff for trasnfer to [facility]. May attempt stair training during tomorrow morning session.     Current Level of Function Impacting Discharge (mobility/balance): CGA     Other factors to consider for discharge:          PLAN :  Patient continues to benefit from skilled intervention to address the above impairments. Continue treatment per established plan of care. to address goals. Recommendation for discharge: (in order for the patient to meet his/her long term goals)  Physical therapy at least 2 days/week in the home AND ensure assist and/or supervision for safety with mobility    This discharge recommendation:  Has been made in collaboration with the attending provider and/or case management    IF patient discharges home will need the following DME: rolling walker       SUBJECTIVE:   Patient stated  doing ok.     OBJECTIVE DATA SUMMARY:   Critical Behavior:  Neurologic State: Alert  Orientation Level: Oriented X4  Cognition: Follows commands     Functional Mobility Training:  Bed Mobility:     Supine to Sit: Contact guard assistance;Assist x1;Additional time  Sit to Supine: Contact guard assistance; Additional time;Assist x1  Scooting: Contact guard assistance        Transfers:  Sit to Stand: Contact guard assistance; Additional time  Stand to Sit: Contact guard assistance; Additional time                             Balance:  Sitting: Intact  Standing: Impaired; With support  Standing - Static: Constant support  Standing - Dynamic : Fair  Ambulation/Gait Training:  Distance (ft): 50 Feet (ft)  Assistive Device: Walker, rolling;Gait belt  Ambulation - Level of Assistance: Contact guard assistance        Gait Abnormalities: Decreased step clearance; Antalgic; Step to gait        Base of Support: Narrowed     Speed/Eloise: Pace decreased (<100 feet/min)                       Stairs:               Therapeutic Exercises:   QS, heel slides, supported standing heel raises  Pain Ratin/10    Activity Tolerance:   Good    After treatment patient left in no apparent distress:   Supine in bed, Call bell within reach, and Bed / chair alarm activated    COMMUNICATION/COLLABORATION:   The patients plan of care was discussed with: Registered nurse. Alisa Coleman   Time Calculation: 23 mins          11/21/2022 1239 by Kenneth Fletcher  Outcome: Progressing Towards Goal  Note:   PHYSICAL THERAPY TREATMENT  Patient: Ivis Asencio (50 y.o. male)  Date: 11/21/2022  Diagnosis: Hip fracture ([address] [S72.009A] <principal problem not specified>  Procedure(s) (LRB):  HIP PERCUTANEOUS PINNING VS FEMUR OPEN REDUCTION INTERNAL FIXATION (Left) 2 Days Post-Op  Precautions: WBAT, Fall  Chart, physical therapy assessment, plan of care and goals were reviewed. ASSESSMENT  Patient continues with skilled PT services and progressing towards goals. Pt reports most comfortable position in sitting vs supine. demonstrated difficultly achieving knee extension in supported standing , no overt buckling noted. Tolerated 20' gait training using RW with assist x 1. Pt remained in chiar at end of session. Current Level of Function Impacting Discharge (mobility/balance): Min A     Other factors to consider for discharge:          PLAN :  Patient continues to benefit from skilled intervention to address the above impairments. Continue treatment per established plan of care. to address goals. Recommendation for discharge: (in order for the patient to meet his/her long term goals)  Physical therapy at least 2 days/week in the home AND ensure assist and/or supervision for safety with mobility    This discharge recommendation:  Has been made in collaboration with the attending provider and/or case management    IF patient discharges home will need the following DME: rolling walker       SUBJECTIVE:   Patient stated doing ok.     OBJECTIVE DATA SUMMARY:   Critical Behavior:  Neurologic State: Alert  Orientation Level: Oriented X4  Cognition: Follows commands     Functional Mobility Training:  Bed Mobility:     Supine to Sit: Minimum assistance; Additional time     Scooting: Contact guard assistance; Additional time        Transfers:  Sit to Stand: Assist x2; Additional time;Contact guard assistance  Stand to Sit: Contact guard assistance;Assist x1;Additional time                             Balance:  Sitting: Intact  Standing: Impaired; With support  Standing - Static: Constant support; Fair  Standing - Dynamic : Fair  Ambulation/Gait Training:  Distance (ft): 20 Feet (ft)  Assistive Device: Walker, rolling;Gait belt  Ambulation - Level of Assistance: Minimal assistance; Additional time;Assist x1        Gait Abnormalities: Decreased step clearance; Step to gait; Antalgic        Base of Support: Narrowed     Speed/Eloise: Pace decreased (<100 feet/min)                       Stairs: Therapeutic Exercises:   QS, ankle pumps, heel slides  Pain Ratin/10    Activity Tolerance:   Good    After treatment patient left in no apparent distress:   Sitting in chair, Call bell within reach, and Bed / chair alarm activated    COMMUNICATION/COLLABORATION:   The patients plan of care was discussed with: Registered nurseIggy Koehler   Time Calculation: 23 mins

## 2022-11-21 NOTE — PROGRESS NOTES
Orthopedic NP Progress Note  Post Op day: 2 Days Post-Op    November 21, 2022 9:45 AM     Kaykay Webb    Attending Physician: Treatment Team: Attending Provider: Hi Rosales MD; Consulting Provider: Asa Finney MD; Consulting Provider: GIACOMO Chaparro; Primary Nurse: Perla Bowman RN; Care Manager: Calin Cowart Primary Nurse: Rojas French, RN; Physical Therapy Assistant: Lisa Rajan; Occupational Therapist: Cynthia Hi OT; Utilization Review: Durr, Larena Krabbe     Vital Signs:    Patient Vitals for the past 8 hrs:   BP Temp Pulse Resp SpO2   11/21/22 0749 (!) 163/87 97.6 °F (36.4 °C) 99 20 91 %   11/21/22 0320 (!) 149/78 97.4 °F (36.3 °C) 98 19 94 %     BMI (calculated): 26.4 (11/18/22 1448)    Intake/Output:  No intake/output data recorded. 11/19 1901 - 11/21 0700  In: 1320 [I.V.:1320]  Out: 935 [Urine:935]    Pain Control:   Pain Assessment  Pain Scale 1: Numeric (0 - 10)  Pain Intensity 1: 6  Pain Location 1: Hip  Pain Orientation 1: Left  Pain Description 1: Dull, Aching  Pain Intervention(s) 1: Medication (see MAR)    LAB:    Recent Labs     11/21/22  0139   HCT 39.9   HGB 12.8     Lab Results   Component Value Date/Time    Sodium 139 11/20/2022 03:17 AM    Potassium 4.5 11/20/2022 03:17 AM    Chloride 106 11/20/2022 03:17 AM    CO2 24 11/20/2022 03:17 AM    Glucose 81 11/20/2022 03:17 AM    BUN 21 (H) 11/20/2022 03:17 AM    Creatinine 1.22 11/20/2022 03:17 AM    Calcium 9.2 11/20/2022 03:17 AM       Subjective:  Kaykay Webb is a 78 y.o. male s/p a  Procedure(s):  HIP PERCUTANEOUS PINNING VS FEMUR OPEN REDUCTION INTERNAL FIXATION   Procedure(s):  HIP PERCUTANEOUS PINNING VS FEMUR OPEN REDUCTION INTERNAL FIXATION. Tolerating diet. Resting in bed with eyes closed, reports pain is well managed and awaiting PT today        Objective: General: alert, cooperative, no distress. Neuro/Vascular: CNS Intact. Sensation stable.  Brisk cap refill, 2+ pulses UE/LE  Musculoskeletal:  +ROM UE/LE, DF/PF, NVI . Skin: Incision - clean, dry and intact. No significant erythema or swelling.     Dressing: clean, dry, and intact    Echols - n  Drain - n       PT/OT:   Gait:                      Assessment:    s/p Procedure(s):  HIP PERCUTANEOUS PINNING VS FEMUR OPEN REDUCTION INTERNAL FIXATION    Active Problems:    Hip fracture ([address] (11/18/2022)         Plan:   -  Continue PT/OT - WBAT LLE   -  Continue established methods of pain control  -  VTE Prophylaxes - TEDS &/or SCDs with lovneox 40mg daily for 30 days   -  pt to follow up with Dr. Pam Cardona in 2 weeks    Discharge To:  [facility] vs SNF     Signed By: Dominic Sheridan NP    Orthopedic Nurse Practitioner

## 2022-11-21 NOTE — PROGRESS NOTES
Problem: Falls - Risk of  Goal: *Absence of Falls  Description: Document Vaughn Deluca Fall Risk and appropriate interventions in the flowsheet.   Outcome: Progressing Towards Goal  Note: Fall Risk Interventions:  Mobility Interventions: Bed/chair exit alarm, Patient to call before getting OOB         Medication Interventions: Bed/chair exit alarm, Patient to call before getting OOB, Teach patient to arise slowly    Elimination Interventions: Call light in reach, Bed/chair exit alarm    History of Falls Interventions: Bed/chair exit alarm         Problem: Patient Education: Go to Patient Education Activity  Goal: Patient/Family Education  Outcome: Progressing Towards Goal     Problem: Patient Education: Go to Patient Education Activity  Goal: Patient/Family Education  Outcome: Progressing Towards Goal     Problem: Hip Fracture: Post-Op Day 2  Goal: Off Pathway (Use only if patient is Off Pathway)  Outcome: Progressing Towards Goal  Goal: Activity/Safety  Outcome: Progressing Towards Goal  Goal: Diagnostic Test/Procedures  Outcome: Progressing Towards Goal  Goal: Nutrition/Diet  Outcome: Progressing Towards Goal  Goal: Medications  Outcome: Progressing Towards Goal  Goal: Respiratory  Outcome: Progressing Towards Goal  Goal: Treatments/Interventions/Procedures  Outcome: Progressing Towards Goal  Goal: Psychosocial  Outcome: Progressing Towards Goal  Goal: Discharge Planning  Outcome: Progressing Towards Goal  Goal: *Optimal pain control at patient's stated goal  Outcome: Progressing Towards Goal  Goal: *Hemodynamically stable  Outcome: Progressing Towards Goal  Goal: *Adequate oxygenation  Outcome: Progressing Towards Goal  Goal: *Tolerates increased activity  Outcome: Progressing Towards Goal  Goal: *Tolerates nutrition therapy  Outcome: Progressing Towards Goal  Goal: *Absence of skin breakdown  Outcome: Progressing Towards Goal

## 2022-11-21 NOTE — PROGRESS NOTES
Problem: Mobility Impaired (Adult and Pediatric)  Goal: *Acute Goals and Plan of Care (Insert Text)  Description: FUNCTIONAL STATUS PRIOR TO ADMISSION: Patient was independent and active without use of DME.    HOME SUPPORT PRIOR TO ADMISSION: The patient lived with spouse and did not require assistance. Physical Therapy Goals  Initiated 11/20/2022  1. Patient will move from supine to sit and sit to supine  in bed with supervision/set-up within 7 day(s). 2.  Patient will transfer from bed to chair and chair to bed with supervision/set-up using the least restrictive device within 7 day(s). 3.  Patient will perform sit to stand with supervision/set-up within 7 day(s). 4.  Patient will ambulate with supervision/set-up for 100 feet with the least restrictive device within 7 day(s). 5.  Patient will ascend/descend 4 stairs with one handrail(s) with minimal assistance/contact guard assist within 7 day(s). Outcome: Progressing Towards Goal  Note:   PHYSICAL THERAPY TREATMENT  Patient: Bradley Lombardo (89 y.o. male)  Date: 11/21/2022  Diagnosis: Hip fracture ([address] [S72.009A] <principal problem not specified>  Procedure(s) (LRB):  HIP PERCUTANEOUS PINNING VS FEMUR OPEN REDUCTION INTERNAL FIXATION (Left) 2 Days Post-Op  Precautions: WBAT, Fall  Chart, physical therapy assessment, plan of care and goals were reviewed. ASSESSMENT  Patient continues with skilled PT services and progressing towards goals. Pt reports most comfortable position in sitting vs supine. demonstrated difficultly achieving knee extension in supported standing , no overt buckling noted. Tolerated 20' gait training using RW with assist x 1. Pt remained in chiar at end of session. Current Level of Function Impacting Discharge (mobility/balance): Min A     Other factors to consider for discharge:          PLAN :  Patient continues to benefit from skilled intervention to address the above impairments.   Continue treatment per established plan of care. to address goals. Recommendation for discharge: (in order for the patient to meet his/her long term goals)  Physical therapy at least 2 days/week in the home AND ensure assist and/or supervision for safety with mobility    This discharge recommendation:  Has been made in collaboration with the attending provider and/or case management    IF patient discharges home will need the following DME: rolling walker       SUBJECTIVE:   Patient stated doing ok.     OBJECTIVE DATA SUMMARY:   Critical Behavior:  Neurologic State: Alert  Orientation Level: Oriented X4  Cognition: Follows commands     Functional Mobility Training:  Bed Mobility:     Supine to Sit: Minimum assistance; Additional time     Scooting: Contact guard assistance; Additional time        Transfers:  Sit to Stand: Assist x2; Additional time;Contact guard assistance  Stand to Sit: Contact guard assistance;Assist x1;Additional time                             Balance:  Sitting: Intact  Standing: Impaired; With support  Standing - Static: Constant support; Fair  Standing - Dynamic : Fair  Ambulation/Gait Training:  Distance (ft): 20 Feet (ft)  Assistive Device: Walker, rolling;Gait belt  Ambulation - Level of Assistance: Minimal assistance; Additional time;Assist x1        Gait Abnormalities: Decreased step clearance; Step to gait; Antalgic        Base of Support: Narrowed     Speed/Eloise: Pace decreased (<100 feet/min)                       Stairs: Therapeutic Exercises:   QS, ankle pumps, heel slides  Pain Ratin/10    Activity Tolerance:   Good    After treatment patient left in no apparent distress:   Sitting in chair, Call bell within reach, and Bed / chair alarm activated    COMMUNICATION/COLLABORATION:   The patients plan of care was discussed with: Registered nurseIggy Angel   Time Calculation: 23 mins

## 2022-11-21 NOTE — PROGRESS NOTES
[address]  [phone]         Hospitalist Progress Note        NAME:  Malia Colon   :  1945   MRN:  [id number]    Date/Time:  2022     Patient PCP:  Kath Kelly DO    Emergency Contact:    Extended Emergency Contact Information  Primary Emergency Contact: Ana M Ventura  Address: [address] Phone: [phone]  Relation: Spouse      Code: Full Code     Isolation Precautions: There are currently no Active Isolations        Subjective:     REASON FOR VISIT:  Recheck femur fracture    HPI & INTERVAL HISTORY:     Mr. Arik Owusu is a 78 y.o. male with history of HTN presents after falling from his horse. He has been in his normal state of health without any chest pain, sob, cough, fever, chills, etc. Simply got caught in the stirrup and went down on his left hip. He is unable to lay flat to tolerate CT.     : Doing well with no complaint. Hip pain continues to improve. No acute events overnight. : Appears to be resting comfortably. Does report continuous moderate and at times severe pain with movement of the left hip rating it at minimum 4-5 out of 10 but can be severe with movement. Percocet takes the edge off and morphine helped significantly when needed. Seems to have tolerated surgical hip repair yesterday well    : The patient was seen and examined. Reports that he is having constant moderate left hip pain which becomes severe with movement due to the broken hip. Pain medications are helping. Scheduled for surgery today.       ALLERGIES  No Known Allergies      ROS:  Gen:   Negative  Eyes:  negative  ENT:    negative  Resp:  negative  CVS:   negative  GI:       negative  :     negative  MS:     negative and left hip pain         Objective:      Visit Vitals  BP (!) 149/78 (BP 1 Location: Left upper arm, BP Patient Position: At rest)   Pulse 98   Temp 97.4 °F (36.3 °C)   Resp 19   Ht 6' (1.829 m)   Wt 88.5 kg (195 lb)   SpO2 94%   BMI 26.45 kg/m²       Physical Exam:  General: Laying in bed in no acute distress  Head: Normocephalic, without obvious abnormality, atraumatic  Eyes: anicteric sclerae and conjuntiva clear  ENT: lips, mucosa, and tongue normal  Neck: normal, supple, and no tenderness  Lungs: clear to auscultation  Heart: S1, S2 normal, regular rate, and regular rhythm  Abd: not distended, soft, nontender, BS present and normactive  Ext: no cyanosis and no edema  Skin: normal skin color, no rashes, and texture normal  Neuro:  alert, oriented, no defects noted in general exam.  Psych: not anxious, cooperative, appropriate affect       Medications:  Current Facility-Administered Medications   Medication Dose Route Frequency    cyclobenzaprine (FLEXERIL) tablet 10 mg  10 mg Oral TID PRN    lisinopriL (PRINIVIL, ZESTRIL) tablet 20 mg  20 mg Oral DAILY    sodium chloride (NS) flush 5-40 mL  5-40 mL IntraVENous Q8H    sodium chloride (NS) flush 5-40 mL  5-40 mL IntraVENous PRN    acetaminophen (TYLENOL) tablet 650 mg  650 mg Oral Q6H PRN    Or    acetaminophen (TYLENOL) suppository 650 mg  650 mg Rectal Q6H PRN    polyethylene glycol (MIRALAX) packet 17 g  17 g Oral DAILY PRN    ondansetron (ZOFRAN ODT) tablet 4 mg  4 mg Oral Q8H PRN    Or    ondansetron (ZOFRAN) injection 4 mg  4 mg IntraVENous Q6H PRN    enoxaparin (LOVENOX) injection 40 mg  40 mg SubCUTAneous DAILY    morphine injection 4 mg  4 mg IntraVENous Q3H PRN    oxyCODONE-acetaminophen (PERCOCET) 5-325 mg per tablet 1 Tablet  1 Tablet Oral Q4H PRN        Labs:  Recent Labs     11/21/22  0139   WBC 11.7*   HGB 12.8   HCT 39.9          Recent Labs     11/20/22  0317 11/18/22  1812    139   K 4.5 3.9    105   CO2 24 26   GLU 81 102*   BUN 21* 21*   CREA 1.22 1.20   CA 9.2 9.8   MG  --  2.2   ALB  --  4.1   TBILI  --  0.8   ALT  --  28 Radiology:  No results found. I personally reviewed and interpreted the imaging studies and agree with official reading    The labs, imaging studies, medications, and consultants notes was reviewed by me on: November 21, 2022         Assessment/Plan:      Left femoral neck impaction fracture as result of falling from horse:   POD # 2 HIP PERCUTANEOUS PINNING  Continue pain control with Tylenol for mild, Roxicodone for moderate, and IV morphine for severe pain   PT / OT / WBAT  Plans for postoperative x-ray in the morning and recheck hemoglobin. Appears that plans are for discharge home with home health care     Leukocytosis 17.0 POA likely due to fracture  Improved. Currently 11.7. No need for antibiotics.       HTN:   On lisinopril    Body mass index is 26.45 kg/m².:  25.0 - 29.9:  Overweight    F: None  E: Monitor  N: ADULT DIET Regular       Risk of deterioration: high      Discussed:  Pt's condition, Imaging findings, Lab findings, Assessment, and Care Plan discussed with: Patient and RN    Prophylaxis:  Lovenox SQ    Anticipated discharge disposition:  [facility] or [facility]    HR DC Barriers: Currently not medically stable for discharge likely tomorrow      Total time: -25- minutes **I personally saw and examined the patient during this time period**      Date of service:    11/21/2022                ___________________________________________________    Admitting Physician: Laura Everett MD

## 2022-11-22 VITALS
OXYGEN SATURATION: 93 % | RESPIRATION RATE: 16 BRPM | WEIGHT: 195 LBS | TEMPERATURE: 99.3 F | HEART RATE: 108 BPM | SYSTOLIC BLOOD PRESSURE: 142 MMHG | HEIGHT: 72 IN | BODY MASS INDEX: 26.41 KG/M2 | DIASTOLIC BLOOD PRESSURE: 83 MMHG

## 2022-11-22 PROCEDURE — 74011250637 HC RX REV CODE- 250/637: Performed by: INTERNAL MEDICINE

## 2022-11-22 PROCEDURE — 97116 GAIT TRAINING THERAPY: CPT

## 2022-11-22 PROCEDURE — 74011250637 HC RX REV CODE- 250/637: Performed by: HOSPITALIST

## 2022-11-22 PROCEDURE — 74011000250 HC RX REV CODE- 250: Performed by: INTERNAL MEDICINE

## 2022-11-22 PROCEDURE — 97110 THERAPEUTIC EXERCISES: CPT

## 2022-11-22 PROCEDURE — 97535 SELF CARE MNGMENT TRAINING: CPT

## 2022-11-22 PROCEDURE — 74011250636 HC RX REV CODE- 250/636: Performed by: INTERNAL MEDICINE

## 2022-11-22 RX ORDER — ENOXAPARIN SODIUM 100 MG/ML
40 INJECTION SUBCUTANEOUS DAILY
Qty: 12 ML | Refills: 0 | Status: SHIPPED | OUTPATIENT
Start: 2022-11-23 | End: 2022-11-22 | Stop reason: SDUPTHER

## 2022-11-22 RX ORDER — OXYCODONE AND ACETAMINOPHEN 5; 325 MG/1; MG/1
1 TABLET ORAL
Qty: 18 TABLET | Refills: 0 | Status: SHIPPED | OUTPATIENT
Start: 2022-11-22 | End: 2022-11-22 | Stop reason: SDUPTHER

## 2022-11-22 RX ORDER — ENOXAPARIN SODIUM 100 MG/ML
40 INJECTION SUBCUTANEOUS DAILY
Qty: 12 ML | Refills: 0 | Status: SHIPPED | OUTPATIENT
Start: 2022-11-23 | End: 2022-12-23

## 2022-11-22 RX ORDER — OXYCODONE AND ACETAMINOPHEN 5; 325 MG/1; MG/1
1 TABLET ORAL
Qty: 12 TABLET | Refills: 0 | Status: SHIPPED | OUTPATIENT
Start: 2022-11-22 | End: 2022-11-25

## 2022-11-22 RX ADMIN — SODIUM CHLORIDE, PRESERVATIVE FREE 10 ML: 5 INJECTION INTRAVENOUS at 05:19

## 2022-11-22 RX ADMIN — ENOXAPARIN SODIUM 40 MG: 100 INJECTION SUBCUTANEOUS at 08:02

## 2022-11-22 RX ADMIN — LISINOPRIL 20 MG: 20 TABLET ORAL at 08:02

## 2022-11-22 RX ADMIN — OXYCODONE AND ACETAMINOPHEN 1 TABLET: 5; 325 TABLET ORAL at 08:02

## 2022-11-22 RX ADMIN — OXYCODONE AND ACETAMINOPHEN 1 TABLET: 5; 325 TABLET ORAL at 13:19

## 2022-11-22 NOTE — PROGRESS NOTES
Problem: Hip Fracture: Post-Op Day 3  Goal: Off Pathway (Use only if patient is Off Pathway)  Outcome: Progressing Towards Goal  Goal: Activity/Safety  Outcome: Progressing Towards Goal  Goal: Diagnostic Test/Procedures  Outcome: Progressing Towards Goal  Goal: Nutrition/Diet  Outcome: Progressing Towards Goal  Goal: Medications  Outcome: Progressing Towards Goal  Goal: Respiratory  Outcome: Progressing Towards Goal  Goal: Treatments/Interventions/Procedures  Outcome: Progressing Towards Goal  Goal: Psychosocial  Outcome: Progressing Towards Goal  Goal: Discharge Planning  Outcome: Progressing Towards Goal  Goal: *Met physical therapy criteria for discharge to next level of care  Outcome: Progressing Towards Goal  Goal: *Optimal pain control at patient's stated goal  Outcome: Progressing Towards Goal  Goal: *Hemodynamically stable  Outcome: Progressing Towards Goal  Goal: *Tolerating diet  Outcome: Progressing Towards Goal  Goal: *Active bowel function  Outcome: Progressing Towards Goal  Goal: *Adequate urinary output  Outcome: Progressing Towards Goal  Goal: *Absence of skin breakdown  Outcome: Progressing Towards Goal  Goal: *Patient verbalizes understanding of discharge instructions  Outcome: Progressing Towards Goal

## 2022-11-22 NOTE — PROGRESS NOTES
Problem: Mobility Impaired (Adult and Pediatric)  Goal: *Acute Goals and Plan of Care (Insert Text)  Description: FUNCTIONAL STATUS PRIOR TO ADMISSION: Patient was independent and active without use of DME.    HOME SUPPORT PRIOR TO ADMISSION: The patient lived with spouse and did not require assistance. Physical Therapy Goals  Initiated 11/20/2022  1. Patient will move from supine to sit and sit to supine  in bed with supervision/set-up within 7 day(s). 2.  Patient will transfer from bed to chair and chair to bed with supervision/set-up using the least restrictive device within 7 day(s). 3.  Patient will perform sit to stand with supervision/set-up within 7 day(s). 4.  Patient will ambulate with supervision/set-up for 100 feet with the least restrictive device within 7 day(s). 5.  Patient will ascend/descend 4 stairs with one handrail(s) with minimal assistance/contact guard assist within 7 day(s). Outcome: Progressing Towards Goal  Note:   PHYSICAL THERAPY TREATMENT  Patient: Shaq Whitfield (80 y.o. male)  Date: 11/22/2022  Diagnosis: Hip fracture ([facility] [address] [S72.009A] <principal problem not specified>  Procedure(s) (LRB):  HIP PERCUTANEOUS PINNING VS FEMUR OPEN REDUCTION INTERNAL FIXATION (Left) 3 Days Post-Op  Precautions: WBAT, Fall  Chart, physical therapy assessment, plan of care and goals were reviewed. ASSESSMENT  Patient continues with skilled PT services and progressing towards goals. Pt eager to participate with therapy   Increase time for mobility tasks tho did not require physical assistance. Occasional verbal cues for sequencing and maintain knee extension on LLE with sance phase of gait, no buckling noted. ascend descend 4 steps using B handrail support.  Pt states daughter and wife will assist him get into home     Current Level of Function Impacting Discharge (mobility/balance): CGA    Other factors to consider for discharge:          PLAN :  Patient continues to benefit from skilled intervention to address the above impairments. Continue treatment per established plan of care. to address goals. Recommendation for discharge: (in order for the patient to meet his/her long term goals)  Physical therapy at least 2 days/week in the home AND ensure assist and/or supervision for safety with mobility    This discharge recommendation:  Has been made in collaboration with the attending provider and/or case management    IF patient discharges home will need the following DME: patient owns DME required for discharge       SUBJECTIVE:   Patient stated  just want to sit up.     OBJECTIVE DATA SUMMARY:   Critical Behavior:  Neurologic State: Alert, Drowsy  Orientation Level: Oriented X4  Cognition: Decreased attention/concentration, Follows commands     Functional Mobility Training:  Bed Mobility:     Supine to Sit: Stand-by assistance; Additional time  Sit to Supine: Stand-by assistance; Additional time           Transfers:  Sit to Stand: Contact guard assistance; Additional time;Assist x1  Stand to Sit: Contact guard assistance;Assist x1                             Balance:  Sitting: Intact  Standing: Impaired; With support  Standing - Static: Constant support  Standing - Dynamic : Constant support;Fair;Good  Ambulation/Gait Training:  Distance (ft): 40 Feet (ft)  Assistive Device: Walker, rolling;Gait belt  Ambulation - Level of Assistance: Contact guard assistance        Gait Abnormalities: Antalgic;Decreased step clearance; Step to gait        Base of Support: Narrowed     Speed/Eloise: Pace decreased (<100 feet/min)                       Stairs:  Number of Stairs Trained: 4 (x2)      Rail Use: Both    Therapeutic Exercises:   QS, heel slide, SAQ  Pain Ratin/10    Activity Tolerance:   Good    After treatment patient left in no apparent distress:   Supine in bed, Call bell within reach, and Bed / chair alarm activated    COMMUNICATION/COLLABORATION:   The patients plan of care was discussed with: Registered nurse.      Ashley Deleon   Time Calculation: 30 mins

## 2022-11-22 NOTE — PROGRESS NOTES
Problem: Self Care Deficits Care Plan (Adult)  Goal: *Acute Goals and Plan of Care (Insert Text)  Description: FUNCTIONAL STATUS PRIOR TO ADMISSION: Patient was independent and active without use of DME. He had fall when getting out of horse saddle. HOME SUPPORT: The patient lived with wife and daughter but did not require assist.    Occupational Therapy Goals  Initiated 11/21/2022  1. Patient will perform grooming in standing with supervision/set-up within 7 day(s). 2.  Patient will perform LB dressing with AD with minimal assistance within 7 day(s). 3.  Patient will perform toilet transfers with supervision/set-up within 7 day(s). 5.  Patient will perform all aspects of toileting with supervision/set-up within 7 day(s). 6.  Patient will participate in upper extremity therapeutic exercise/activities with supervision/set-up for 7 minutes within 7 day(s). 7.  Patient will utilize energy conservation techniques during functional activities with verbal cues within 7 day(s). Outcome: Progressing Towards Goal   OCCUPATIONAL THERAPY TREATMENT  Patient: Sarah Akins (89 y.o. male)  Date: 11/22/2022  Diagnosis: [facility] [S72.009A] <principal problem not specified>  Procedure(s) (LRB):  HIP PERCUTANEOUS PINNING VS FEMUR OPEN REDUCTION INTERNAL FIXATION (Left) 3 Days Post-Op  Precautions: WBAT, Fall  Chart, occupational therapy assessment, plan of care, and goals were reviewed. ASSESSMENT  Patient continues with skilled OT services and is progressing towards goals. Pt stand by assist for grooming standing at sink. He will have assist of family at home as needed. Pt left seated in chair and is clear from OT once medically clear. Current Level of Function Impacting Discharge (ADLs): stand by assist grooming at sink    Other factors to consider for discharge:          PLAN :  Patient continues to benefit from skilled intervention to address the above impairments.   Continue treatment per established plan of care to address goals. Recommend with staff: out of bed for ADL's ,there ex, there act,    Recommend next OT session: Pt clear from an OT standpoint    Recommendation for discharge: (in order for the patient to meet his/her long term goals)  Occupational therapy at least 2 days/week in the home     This discharge recommendation:  Has not yet been discussed the attending provider and/or case management    IF patient discharges home will need the following DME:        SUBJECTIVE:   Patient stated Staci Bird will get clothes this afternoon when my wife gets here.     OBJECTIVE DATA SUMMARY:   Cognitive/Behavioral Status:  Neurologic State: Alert  Orientation Level: Oriented X4  Cognition: Decreased attention/concentration; Follows commands             Functional Mobility and Transfers for ADLs:  Bed Mobility:  Supine to Sit: Stand-by assistance; Additional time  Sit to Supine: Stand-by assistance; Additional time    Transfers:  Sit to Stand: Contact guard assistance; Additional time;Assist x1          Balance:  Sitting: Intact  Standing: Impaired; With support  Standing - Static: Constant support  Standing - Dynamic : Constant support;Fair;Good    ADL Intervention:       Grooming  Grooming Assistance: Stand-by assistance  Position Performed: Standing  Brushing Teeth: Stand-by assistance         Activity Tolerance:   Good    After treatment patient left in no apparent distress:   Sitting in chair    COMMUNICATION/COLLABORATION:   The patients plan of care was discussed with: Physical therapy assistant and Occupational therapist.     CELSA Lane  Time Calculation: 15 mins

## 2022-11-22 NOTE — PROGRESS NOTES
PCP  Kath Kelly DO [facility] practice prefers that patients contact office to schedule follow up appointments

## 2022-11-22 NOTE — PROGRESS NOTES
Spiritual Care Assessment/Progress Note  [address]      NAME: John Simpson      MRN: [id number]  AGE: 78 y.o.  SEX: male  Caodaism Affiliation: Gnosticism   Language: English     11/22/2022     Total Time (in minutes): 27     Spiritual Assessment begun in [facility] 4M POST SURG ORT 1 through conversation with:         [x]Patient        [] Family    [] Friend(s)        Reason for Consult: Initial/Spiritual assessment, patient floor     Spiritual beliefs: (Please include comment if needed)     [x] Identifies with a chau tradition:    Samaritan      [] Supported by a chau community:       none      [] Claims no spiritual orientation:           [] Seeking spiritual identity:                [] Adheres to an individual form of spirituality:           [] Not able to assess:                           Identified resources for coping:      [x] Prayer                               [] Music                  [] Guided Imagery     [x] Family/friends                 [] Pet visits     [] Devotional reading                         [] Unknown     [] Other:                                               Interventions offered during this visit: (See comments for more details)    Patient Interventions: Affirmation of emotions/emotional suffering, Catharsis/review of pertinent events in supportive environment, Coping skills reviewed/reinforced, Initial/Spiritual assessment, patient floor, Life review/legacy, Normalization of emotional/spiritual concerns, Prayer (assurance of), Caodaism beliefs/image of God discussed           Plan of Care:     [] Support spiritual and/or cultural needs    [] Support AMD and/or advance care planning process      [] Support grieving process   [] Coordinate Rites and/or Rituals    [] Coordination with community clergy   [] No spiritual needs identified at this time   [] Detailed Plan of Care below (See Comments)  [] Make referral to Music Therapy  [] Make referral to Pet Therapy     [] Make referral to Addiction services  [] Make referral to [facility]  [] Make referral to Spiritual Care Partner  [] No future visits requested        [x] Contact Spiritual Care for further referrals     Comments:   Spiritual care assessment with Mr. Vasyl Aleman on 4M post surg unit. Mr Lauryn Colon is sitting on edge of bed working by his phone to have move his pharmacy to a different one so he can receive his medication. He is discharged and has family on the way to pick him up. He has a wife and daughter back home for him. He shared that [facility] but does not attend any Alev[employer] anymore. He baljit by the support of his family and self resilience. He shared what is currently going on with him and thanked  for the care and support.      Chaplain Rosemarie Foote M.Div.  Jeovany Lang ([id number])

## 2022-11-22 NOTE — PROGRESS NOTES
RRT Note    MEWS 3.     Per primary RN, Ramiro Law, pt was slightly hypoxic earlier and required 2L NC with increase in sats to upper 90s. Pt also had elevated HR during movement. Primary RN does not request additional assistance at this time.

## 2022-11-22 NOTE — DISCHARGE SUMMARY
Hospitalist Discharge Summary     Patient ID:  Dawn Rachel  [id number]  78 y.o.  1945    Admit date: 11/18/2022    Discharge date and time: 11/22/2022    Admission Diagnoses: Hip fracture [facility]) [S72.009A]    Discharge Diagnoses: Active Problems:    Hip fracture [facility]) (11/18/2022)           Hospital Course:   Angelo Espino is a 78 y.o.  hx HTN presents after falling from his horse. He has been in his normal state of health without any chest pain, sob, cough, fever, chills, etc. Simply got caught in the stirrup and went down on his left hip. He was admitted for further evaluation and treatment of the following:    #L femoral neck impaction fracture: Underwent pinning without complication. Has tolerated PT well and discharges home with [place] [facility] and plan for [facility]WH PPX x 30 days.      #HTN: Cont home tania        PCP: Jacquie Pereira DO     Consults: Orthopedic Surgery    Condition of patient at discharge: good and improved    Discharge Exam:    Physical Exam:    Gen: Well-developed, well-nourished, in no acute distress  HEENT:  Pink conjunctivae, PERRL, hearing intact to voice, moist mucous membranes  Neck: Supple, without masses, thyroid non-tender  Resp: No accessory muscle use, clear breath sounds without wheezes rales or rhonchi  Card: No murmurs, normal S1, S2 without thrills, bruits or peripheral edema  Abd:  Soft, non-tender, non-distended, normoactive bowel sounds are present, no palpable organomegaly and no detectable hernias  Lymph:  No cervical or inguinal adenopathy  Musc: No cyanosis or clubbing  Skin: No rashes or ulcers, skin turgor is good  Neuro:  Cranial nerves are grossly intact, no focal motor weakness, follows commands appropriately  Psych:  Good insight, oriented to person, place and time, alert          Disposition: home    Patient Instructions:   Current Discharge Medication List        START taking these medications    Details   enoxaparin (LOVENOX) 40 mg/0.4 mL 0.4 mL by SubCUTAneous route daily for 30 days. Qty: 12 mL, Refills: 0      oxyCODONE-acetaminophen (PERCOCET) 5-325 mg per tablet Take 1 Tablet by mouth every four (4) hours as needed for Pain for up to 3 days. Max Daily Amount: 6 Tablets. Qty: 18 Tablet, Refills: 0    Associated Diagnoses: Closed fracture of left hip, initial encounter ([facility] [address]           CONTINUE these medications which have NOT CHANGED    Details   aspirin delayed-release 81 mg tablet Take 81 mg by mouth daily. Indications: prevention for a blood clot going to the brain      multivitamin (ONE A DAY) tablet Take 1 Tablet by mouth daily. ferrous sulfate 325 mg (65 mg iron) tablet Take 65 mg by mouth daily. folic acid 430 mcg tablet Take 400 mg by mouth daily. magnesium 250 mg tab Take 250 mg by mouth daily. lisinopril (PRINIVIL, ZESTRIL) 20 mg tablet Take 20 mg by mouth daily. doxycycline (ADOXA) 100 mg tablet Take 100 mg by mouth daily. DULOXETINE HCL, BULK, Take 90 mg by mouth every evening. STOP taking these medications       traMADol (ULTRAM) 50 mg tablet Comments:   Reason for Stopping:         pantoprazole (PROTONIX) 20 mg tablet Comments:   Reason for Stopping:             Activity: WBAT  Diet: Regular Diet  Wound Care: Keep wound clean and dry    Follow-up with Orestes eFrguson DO in 1 week. Follow-up tests/labs None    Approximate time spent in patient care on day of discharge: 20 min    Signed:   Mj Jorge MD  11/22/2022  10:53 AM

## 2022-11-22 NOTE — PROGRESS NOTES
Orthopaedic Progress Note  Post Op day: 3 Days Post-Op    2022 12:58 PM     Patient: Aleshia Reeves MRN: [id number]  SSN: [id number]    YOB: 1945  Age: 78 y.o. Sex: male      Admit date:  2022  Date of Surgery:  2022   Procedures:  Procedure(s):  HIP PERCUTANEOUS PINNING VS FEMUR OPEN REDUCTION INTERNAL FIXATION  Admitting Physician:  Susan Jauregui MD   Surgeon:  Aisha Castillo) and Role:     Burke Muhammad MD - Primary    Consulting Physician(s): Treatment Team: Attending Provider: Gee Alfaro MD; Consulting Provider: Alexa Jordan MD; Consulting Provider: GIACOMO Zelyaa; Primary Nurse: Andrew Case RN; Utilization Review: Lauren Painting; Care Manager: Cameron Graham Primary Nurse: Betsey French RN; Physical Therapy Assistant: Michael Lynch; Occupational Therapy Assistant: GORDON Rosenthal    SUBJECTIVE:     Aleshia Reeves is a 78 y.o. male is 3 Days Post-Op s/p Procedure(s):  HIP PERCUTANEOUS PINNING VS FEMUR OPEN REDUCTION INTERNAL FIXATION with an appropriate level of post-operative pain. No complaints of nausea, vomiting, dizziness, lightheadedness, chest pain, or shortness of breath. OBJECTIVE:       Physical Exam:  General: Alert, cooperative, no distress. Respiratory: Respirations unlabored  Neurological:  Neurovascular exam within normal limits. Motor: + DF/PF. Musculoskeletal: Calves soft, supple, non-tender upon palpation. Thigh is soft and compressible. No pain with palpation of thigh. Moves ankles ok. +DP and PT pulses LLE. Dressing/Wound:  Clean, dry and intact. No significant erythema or swelling.       Vital Signs:      Patient Vitals for the past 8 hrs:   BP Temp Pulse Resp SpO2   22 1122 (!) 142/83 99.3 °F (37.4 °C) (!) 108 16 93 %   22 0722 (!) 166/94 98.6 °F (37 °C) 96 16 94 %                                          Temp (24hrs), Av.5 °F (36.9 °C), Min:98 °F (36.7 °C), Max:99.3 °F (37.4 °C)      Labs:        Recent Labs     11/21/22  0139   HCT 39.9   HGB 12.8     Lab Results   Component Value Date/Time    Sodium 139 11/20/2022 03:17 AM    Potassium 4.5 11/20/2022 03:17 AM    Chloride 106 11/20/2022 03:17 AM    CO2 24 11/20/2022 03:17 AM    Glucose 81 11/20/2022 03:17 AM    BUN 21 (H) 11/20/2022 03:17 AM    Creatinine 1.22 11/20/2022 03:17 AM    Calcium 9.2 11/20/2022 03:17 AM       PT/OT:        Gait  Base of Support: Narrowed  Speed/Eloise: Pace decreased (<100 feet/min)  Gait Abnormalities: Antalgic, Decreased step clearance, Step to gait  Ambulation - Level of Assistance: Contact guard assistance  Distance (ft): 40 Feet (ft)  Assistive Device: Walker, rolling, Gait belt  Rail Use: Both  Number of Stairs Trained: 4 (x2)       Patient mobility  Bed Mobility Training  Supine to Sit: Stand-by assistance, Additional time  Sit to Supine: Stand-by assistance, Additional time  Scooting: Contact guard assistance  Transfer Training  Sit to Stand: Contact guard assistance, Additional time, Assist x1  Stand to Sit: Contact guard assistance, Assist x1      Gait Training  Assistive Device: Walker, rolling, Gait belt  Ambulation - Level of Assistance: Contact guard assistance  Distance (ft): 40 Feet (ft)  Number of Stairs Trained: 4 (x2)  Rail Use: Both   Weight Bearing Status  Left Side Weight Bearing: As tolerated        ASSESSMENT / PLAN:   Active Problems:    Hip fracture ([address] (11/18/2022)                A: 1. S/P left hip femoral neck ORIF POD 3    Pain Control: Tylenol and Roxicodone. DVT Prophylaxis: Lovenox 40 mg SC daily. Continue for 1 month. Hemodynamics: HgB 11.7   Activity: WBAT LLE   Disposition: Home w/ Family when cleared by therapy. F/U with Dr. Moustapha Kirk in 2 weeks. DC in chart.     Signed By:  Ton Griggs, [address] [facility] [address]

## 2022-11-22 NOTE — PROGRESS NOTES
Medicare pt has received, reviewed, and signed 2nd IM letter informing them of their right to appeal the discharge. Signed copy has been placed on pt bedside chart.   Rudy Devine CMS

## 2022-11-22 NOTE — PROGRESS NOTES
11/22/2022    12:33 PM  CM received acceptance from [facility].     12:29 PM  Care Management Progress Note      ICD-10-CM ICD-9-CM    1. Fall from horse, initial encounter  V80.010A E828.2       2. Closed fracture of left hip, initial encounter ([facility] [address]  S72.002A 820.8 oxyCODONE-acetaminophen (PERCOCET) 5-325 mg per tablet          RUR:  8%  Risk Level: [x]Low []Moderate []High  Value-based purchasing: [] Yes [x] No  Bundle patient: [] Yes [x] No   Specify:     Transition of care plan:  Discharge order submitted. Pt has medically cleared. Home with [facility] - CM consult for [facility] noted. Freedom of Choice offered, and pt indicated [facility] or At  "[employer] as preferences. CM completed referral to [facility], and is awaiting response. Outpatient follow-up. Pt's family to transport. Care Management Interventions  PCP Verified by CM:  Yes (Veronica Finley)  Support Systems: Spouse/Significant Other  Confirm Follow Up Transport: Family  The Plan for Transition of Care is Related to the Following Treatment Goals : Hip fracture  The Patient and/or Patient Representative was Provided with a Choice of Provider and Agrees with the Discharge Plan?: (S) Yes  Name of the Patient Representative Who was Provided with a Choice of Provider and Agrees with the Discharge Plan: Self  Freedom of Choice List was Provided with Basic Dialogue that Supports the Patient's Individualized Plan of Care/Goals, Treatment Preferences and Shares the Quality Data Associated with the Providers?: (S) Yes  Discharge Location  Patient Expects to be Discharged to[de-identified] Home with home health

## 2023-10-31 NOTE — PROCEDURES
Caller: MATTHEW JONES    Relationship: Other    Best call back number: [phone]     What orders are you requesting (i.e. lab or imaging): PT FOR 2 TIMES A WEEK FOR 4 WEEKS AND THEN 1 TIME A WEEK FOR 3 WEEKS            Shakira Duran M.D. 2021    Esophagogastroduodenoscopy (EGD) Colonoscopy Procedure Note  Thomas Cruz  : 1945  [facility] [place] Medical Record Number: [id number]      Indications:    acute blood loss anemia  Referring Physician:  Virgina Favre, DO  Anesthesia/Sedation: see nursing notes  Endoscopist:  Dr. Delia Dorado  Assistants: None  Permit:  The indications, risks, benefits and alternatives were reviewed with the patient or their decision maker who was provided an opportunity to ask questions and all questions were answered. The specific risks of esophagogastroduodenoscopy with conscious sedation were reviewed, including but not limited to anesthetic complication, bleeding, adverse drug reaction, missed lesion, infection, IV site reactions, and intestinal perforation which would lead to the need for surgical repair. Alternatives to EGD including radiographic imaging, observation without testing, or laboratory testing were reviewed as well as the limitations of those alternatives discussed. After considering the options and having all their questions answered, the patient or their decision maker provided both verbal and written consent to proceed. Procedure in Detail:  After obtaining informed consent, positioning of the patient in the left lateral decubitus position, and conduction of a pre-procedure pause or \"time out\" the endoscope was introduced into the mouth and advanced to the duodenum. A careful inspection was made, and findings or interventions are described below.     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 Delia Dorado  Assistants: None  Complications:  None  Estimate Blood Loss:  None    Colonoscopy is to follow    Permit:  The indications, risks, benefits and alternatives were reviewed with the patient or their decision maker who was provided an opportunity to ask questions and all questions were answered. The specific risks of colonoscopy with conscious sedation were reviewed, including but not limited to anesthetic complication, bleeding, adverse drug reaction, missed lesion, infection, IV site reactions, and intestinal perforation which would lead to the need for surgical repair. Alternatives to colonoscopy including radiographic imaging, observation without testing, or laboratory testing were reviewed including the limitations of those alternatives. After considering the options and having all their questions answered, the patient or their decision maker provided both verbal and written consent to proceed. Procedure in Detail:  After obtaining informed consent, positioning of the patient in the left lateral decubitus position, and conduction of a pre-procedure pause or \"time out\" the endoscope was introduced into the anus and advanced to the terminal ileum. The quality of the colonic preparation was good. A careful inspection was made as the colonoscope was withdrawn, findings and interventions are described below. Appendiceal orifice photographed    Findings:       - Diverticulosis length of colon; no bleeding, inflammation  Hemorrhoids not bleeding  10 mm sessile ascending polyp    Specimens:    polyp removed cold snare    Implants: none    Complications:   None; patient tolerated the procedure well. Estimated blood loss: none    Impression:  Likely source bleeding colonic diverticulosis; incidental findings hemorrhoids, polyp, Juan Pablo's lesions, hiatal hernia    Recommendations:      - Because Juan Pablo's lesions can in of themnselves cause anemia recommend permanent BID PPI of your choice.    Because of age, routine follow up exam not recommended  From GI point of view can d/c when convenient  Thanks    Thank you for entrusting me with this patient's care. Please do not hesitate to contact me with any questions or if I can be of assistance with any of your other patients' GI needs.     Signed By: Johanna Turk MD                        August 9, 2021

## (undated) DEVICE — CATH IV AUTOGRD BC PNK 20GA 25 -- INSYTE

## (undated) DEVICE — (D)SENSOR RMFG 02 PULS OXMTR -- DISC BY MFR USE ITEM 133445

## (undated) DEVICE — 3.2MM GUIDE WIRE 400MM

## (undated) DEVICE — BIT DRL L413MM DIA4.3MM FOR FEM NK SYSTEN

## (undated) DEVICE — KIT COLON W/ 1.1OZ LUB AND 2 END

## (undated) DEVICE — SET GRAV CK VLV NEEDLESS ST 3 GANGED 4WAY STPCOCK HI FLO 10

## (undated) DEVICE — YANKAUER,BULB TIP,W/O VENT,RIGID,STERILE: Brand: MEDLINE

## (undated) DEVICE — SNARE ENDOSCP M L240CM W27MM SHTH DIA2.4MM CHN 2.8MM OVL

## (undated) DEVICE — 6619 2 PTNT ISO SYS INCISE AREA&LT;(&GT;&&LT;)&GT;P: Brand: STERI-DRAPE™ IOBAN™ 2

## (undated) DEVICE — SPONGE GZ W4XL4IN COT 12 PLY TYP VII WVN C FLD DSGN

## (undated) DEVICE — BITEBLOCK ENDOSCP 60FR MAXI WHT POLYETH STURDY W/ VELC WVN

## (undated) DEVICE — SOLUTION IRRIG 1000ML 0.9% SOD CHL USP POUR PLAS BTL

## (undated) DEVICE — SUTURE VCRL SZ 2-0 L36IN ABSRB UD L40MM CT 1/2 CIR J957H

## (undated) DEVICE — 1010 S-DRAPE TOWEL DRAPE 10/BX: Brand: STERI-DRAPE™

## (undated) DEVICE — CONTAINER,SPECIMEN,3OZ,OR STRL: Brand: MEDLINE

## (undated) DEVICE — GOWN,PREVENTION PLUS,XLN/XL,ST,24/CS: Brand: MEDLINE

## (undated) DEVICE — SIMPLICITY FLUFF UNDERPAD 23X36, MODERATE: Brand: SIMPLICITY

## (undated) DEVICE — 3M™ CUROS™ DISINFECTING CAP FOR NEEDLELESS CONNECTORS 270/CARTON 20 CARTONS/CASE CFF1-270: Brand: CUROS™

## (undated) DEVICE — DRAPE C-ARMOUR C-ARM KIT --

## (undated) DEVICE — BRUSH SCRB 4% CHG RED DISP --

## (undated) DEVICE — POLYP TRAP: Brand: TRAPEASE®

## (undated) DEVICE — CONTAINER SPEC 20 ML LID NEUT BUFF FORMALIN 10 % POLYPR STS

## (undated) DEVICE — SUTURE STRATAFIX SPRL SZ 1 L14IN ABSRB VLT L48CM CTX 1/2 SXPD2B405

## (undated) DEVICE — FORCEPS BX L240CM JAW DIA2.8MM L CAP W/ NDL MIC MESH TOOTH

## (undated) DEVICE — BAG SPEC BIOHZRD 10 X 10 IN --

## (undated) DEVICE — Device

## (undated) DEVICE — CANN NASAL O2 CAPNOGRAPHY AD -- FILTERLINE

## (undated) DEVICE — CANISTER, RIGID, 3000CC: Brand: MEDLINE INDUSTRIES, INC.

## (undated) DEVICE — ZIMMER® STERILE DISPOSABLE TOURNIQUET CUFF WITH PROTECTIVE SLEEVE AND PLC, DUAL PORT, SINGLE BLADDER, 34 IN. (86 CM)

## (undated) DEVICE — SUTURE MCRYL SZ 3-0 L27IN ABSRB UD L24MM PS-1 3/8 CIR PRIM Y936H

## (undated) DEVICE — BANDAGE COMPR M W6INXL10YD WHT BGE VELC E MTRX HK AND LOOP

## (undated) DEVICE — SOLIDIFIER MEDC 1200ML -- CONVERT TO 356117

## (undated) DEVICE — DRAPE,REIN 53X77,STERILE: Brand: MEDLINE

## (undated) DEVICE — TOTAL JOINT-SFMC: Brand: MEDLINE INDUSTRIES, INC.

## (undated) DEVICE — CUFF RMFG BP INF SZ 11 DISP -- LAWSON OEM ITEM 238915

## (undated) DEVICE — 1200 GUARD II KIT W/5MM TUBE W/O VAC TUBE: Brand: GUARDIAN